# Patient Record
Sex: MALE | Race: WHITE | NOT HISPANIC OR LATINO | Employment: FULL TIME | URBAN - METROPOLITAN AREA
[De-identification: names, ages, dates, MRNs, and addresses within clinical notes are randomized per-mention and may not be internally consistent; named-entity substitution may affect disease eponyms.]

---

## 2018-11-23 ENCOUNTER — HOSPITAL ENCOUNTER (EMERGENCY)
Facility: CLINIC | Age: 48
Discharge: HOME | End: 2018-11-23
Attending: NURSE PRACTITIONER | Admitting: NURSE PRACTITIONER
Payer: COMMERCIAL

## 2018-11-23 VITALS
OXYGEN SATURATION: 98 % | BODY MASS INDEX: 30.46 KG/M2 | WEIGHT: 245 LBS | SYSTOLIC BLOOD PRESSURE: 135 MMHG | HEIGHT: 75 IN | RESPIRATION RATE: 18 BRPM | TEMPERATURE: 97.9 F | HEART RATE: 62 BPM | DIASTOLIC BLOOD PRESSURE: 82 MMHG

## 2018-11-23 DIAGNOSIS — G89.29 CHRONIC RIGHT-SIDED LOW BACK PAIN WITH RIGHT-SIDED SCIATICA: ICD-10-CM

## 2018-11-23 DIAGNOSIS — M54.41 CHRONIC RIGHT-SIDED LOW BACK PAIN WITH RIGHT-SIDED SCIATICA: ICD-10-CM

## 2018-11-23 PROCEDURE — 25000128 H RX IP 250 OP 636: Performed by: NURSE PRACTITIONER

## 2018-11-23 PROCEDURE — 25000132 ZZH RX MED GY IP 250 OP 250 PS 637: Performed by: NURSE PRACTITIONER

## 2018-11-23 PROCEDURE — 99285 EMERGENCY DEPT VISIT HI MDM: CPT

## 2018-11-23 PROCEDURE — 96372 THER/PROPH/DIAG INJ SC/IM: CPT

## 2018-11-23 RX ORDER — PREDNISONE 20 MG/1
TABLET ORAL
Qty: 10 TABLET | Refills: 0 | Status: SHIPPED | OUTPATIENT
Start: 2018-11-23 | End: 2024-02-13

## 2018-11-23 RX ORDER — CYCLOBENZAPRINE HCL 10 MG
10 TABLET ORAL ONCE
Status: COMPLETED | OUTPATIENT
Start: 2018-11-23 | End: 2018-11-23

## 2018-11-23 RX ORDER — ACETAMINOPHEN 500 MG
1000 TABLET ORAL ONCE
Status: COMPLETED | OUTPATIENT
Start: 2018-11-23 | End: 2018-11-23

## 2018-11-23 RX ORDER — OXYCODONE HYDROCHLORIDE 5 MG/1
5 TABLET ORAL ONCE
Status: COMPLETED | OUTPATIENT
Start: 2018-11-23 | End: 2018-11-23

## 2018-11-23 RX ORDER — CYCLOBENZAPRINE HCL 10 MG
10 TABLET ORAL 3 TIMES DAILY PRN
Qty: 20 TABLET | Refills: 0 | Status: SHIPPED | OUTPATIENT
Start: 2018-11-23 | End: 2018-11-29

## 2018-11-23 RX ORDER — LIDOCAINE 4 G/G
1 PATCH TOPICAL ONCE
Status: DISCONTINUED | OUTPATIENT
Start: 2018-11-23 | End: 2018-11-23 | Stop reason: HOSPADM

## 2018-11-23 RX ORDER — LIDOCAINE 50 MG/G
PATCH TOPICAL
Qty: 30 PATCH | Refills: 0 | Status: SHIPPED | OUTPATIENT
Start: 2018-11-23 | End: 2024-02-13

## 2018-11-23 RX ADMIN — OXYCODONE HYDROCHLORIDE 5 MG: 5 TABLET ORAL at 20:17

## 2018-11-23 RX ADMIN — HYDROMORPHONE HYDROCHLORIDE 1 MG: 1 INJECTION, SOLUTION INTRAMUSCULAR; INTRAVENOUS; SUBCUTANEOUS at 21:10

## 2018-11-23 RX ADMIN — CYCLOBENZAPRINE HYDROCHLORIDE 10 MG: 10 TABLET, FILM COATED ORAL at 20:17

## 2018-11-23 RX ADMIN — ACETAMINOPHEN 1000 MG: 500 TABLET, FILM COATED ORAL at 20:16

## 2018-11-23 RX ADMIN — LIDOCAINE 1 PATCH: 560 PATCH PERCUTANEOUS; TOPICAL; TRANSDERMAL at 20:19

## 2018-11-23 ASSESSMENT — ENCOUNTER SYMPTOMS
COLOR CHANGE: 0
BACK PAIN: 1
NUMBNESS: 1
FEVER: 0
DYSURIA: 0
FREQUENCY: 0
ABDOMINAL PAIN: 0
MYALGIAS: 1
WEAKNESS: 0

## 2018-11-23 NOTE — ED AVS SNAPSHOT
Emergency Department    [address] [facility] [address]    Phone:  [phone]    Fax:  [phone]                                       Zach Cowan   MRN: [id number]    Department:   Emergency Department   Date of Visit:  11/23/2018           Patient Information     Date Of Birth          1970        Your diagnoses for this visit were:     Chronic right-sided low back pain with right-sided sciatica acute on chronic       You were seen by Irma Matute, CNP.      Follow-up Information     Schedule an appointment as soon as possible for a visit with Rm Quinteros MD.    Specialty:  Physical Medicine and Rehab    Contact information:    RM QUINTEROS  [address]  [facility] [address] [phone]          Follow up with  Emergency Department.    Specialty:  EMERGENCY MEDICINE    Why:  As needed, If symptoms worsen    Contact information:    [address] [facility] [address] [phone]        Discharge Instructions       Discharge Instructions  Back Pain  You were seen today for back pain. Back pain can have many causes, but most will get better without surgery or other specific treatment. Sometimes there is a herniated ( slipped ) disc. We do not usually do MRI scans to look for these right away, since most herniated discs will get better on their own with time.  Today, we did not find any evidence that your back pain was caused by a serious condition. However, sometimes symptoms develop over time and cannot be found during an emergency visit, so it is very important that you follow up with your primary provider.  Generally, every Emergency Department visit should have a follow-up clinic visit with either a primary or a specialty clinic/provider. Please follow-up as instructed by your emergency provider today.    Return to the Emergency Department if:    You develop a fever with your back pain.     You have weakness or change in sensation in one  or both legs.    You lose control of your bowels or bladder, or cannot empty your bladder (cannot pee).    Your pain gets much worse.     Follow-up with your provider:    Unless your pain has completely gone away, please make an appointment with your provider within one week. Most of the routine care for back pain is available in a clinic and not the Emergency Department. You may need further management of your back pain, such as more pain medication, imaging such as an X-ray or MRI, or physical therapy.    What can I do to help myself?    Remain Active -- People are often afraid that they will hurt their back further or delay recovery by remaining active, but this is one of the best things you can do for your back. In fact, staying in bed for a long time to rest is not recommended. Studies have shown that people with low back pain recover faster when they remain active. Movement helps to bring blood flow to the muscles and relieve muscle spasms as well as preventing loss of muscle strength.    Heat -- Using a heating pad can help with low back pain during the first few weeks. Do not sleep with a heating pad, as you can be burned.     Pain medications - You may take a pain medication such as Tylenol  (acetaminophen), Advil , Motrin  (ibuprofen) or Aleve  (naproxen).  If you were given a prescription for medicine here today, be sure to read all of the information (including the package insert) that comes with your prescription.  This will include important information about the medicine, its side effects, and any warnings that you need to know about.  The pharmacist who fills the prescription can provide more information and answer questions you may have about the medicine.  If you have questions or concerns that the pharmacist cannot address, please call or return to the Emergency Department.   Remember that you can always come back to the Emergency Department if you are not able to see your regular provider in the  amount of time listed above, if you get any new symptoms, or if there is anything that worries you.      24 Hour Appointment Hotline       To make an appointment at any [facility], call [phone] ([phone]). If you don't have a family doctor or clinic, we will help you find one. [facility] clinics are conveniently located to serve the needs of you and your family.             Review of your medicines      START taking        Dose / Directions Last dose taken    cyclobenzaprine 10 MG tablet   Commonly known as:  FLEXERIL   Dose:  10 mg   Quantity:  20 tablet        Take 1 tablet (10 mg) by mouth 3 times daily as needed for muscle spasms   Refills:  0        lidocaine 5 % Patch   Commonly known as:  LIDODERM   Quantity:  30 patch        Apply up to 3 patches to painful area at once for up to 12 h within a 24 h period.  Remove after 12 hours.   Refills:  0        predniSONE 20 MG tablet   Commonly known as:  DELTASONE   Quantity:  10 tablet        Take two tablets (= 40mg) each day for 5 (five) days   Refills:  0                Prescriptions were sent or printed at these locations (3 Prescriptions)                   Other Prescriptions                Printed at Department/Unit printer (3 of 3)         cyclobenzaprine (FLEXERIL) 10 MG tablet               lidocaine (LIDODERM) 5 % Patch               predniSONE (DELTASONE) 20 MG tablet                Orders Needing Specimen Collection     None      Pending Results     No orders found from 11/21/2018 to 11/24/2018.            Pending Culture Results     No orders found from 11/21/2018 to 11/24/2018.            Pending Results Instructions     If you had any lab results that were not finalized at the time of your Discharge, you can call the ED Lab Result RN at [phone]. You will be contacted by this team for any positive Lab results or changes in treatment. The nurses are available 7 days a week from 10A to 6:30P.  You can leave a message 24 hours per day  and they will return your call.        Test Results From Your Hospital Stay               Clinical Quality Measure: Blood Pressure Screening     Your blood pressure was checked while you were in the emergency department today. The last reading we obtained was  BP: 157/89 . Please read the guidelines below about what these numbers mean and what you should do about them.  If your systolic blood pressure (the top number) is less than 120 and your diastolic blood pressure (the bottom number) is less than 80, then your blood pressure is normal. There is nothing more that you need to do about it.  If your systolic blood pressure (the top number) is 120-139 or your diastolic blood pressure (the bottom number) is 80-89, your blood pressure may be higher than it should be. You should have your blood pressure rechecked within a year by a primary care provider.  If your systolic blood pressure (the top number) is 140 or greater or your diastolic blood pressure (the bottom number) is 90 or greater, you may have high blood pressure. High blood pressure is treatable, but if left untreated over time it can put you at risk for heart attack, stroke, or kidney failure. You should have your blood pressure rechecked by a primary care provider within the next 4 weeks.  If your provider in the emergency department today gave you specific instructions to follow-up with your doctor or provider even sooner than that, you should follow that instruction and not wait for up to 4 weeks for your follow-up visit.        Thank you for choosing [facility]       Thank you for choosing [facility] for your care. Our goal is always to provide you with excellent care. Hearing back from our patients is one way we can continue to improve our services. Please take a few minutes to complete the written survey that you may receive in the mail after you visit with us. Thank you!        [employer] Information     [employer] lets you send messages to your doctor, view  "your test results, renew your prescriptions, schedule appointments and more. To sign up, go to [email or web address] . Click on \"Log in\" on the left side of the screen, which will take you to the Welcome page. Then click on \"Sign up Now\" on the right side of the page.     You will be asked to enter the access code listed below, as well as some personal information. Please follow the directions to create your username and password.     Your access code is: [email or web address]  Expires: 2019  9:08 PM     Your access code will  in 90 days. If you need help or a new code, please call your [place] clinic or [phone].        Care EveryWhere ID     This is your Care EveryWhere ID. This could be used by other organizations to access your [place] medical records  [id number]        Equal Access to Services     [facility] Center[facility]AR[facility] : Hadbriana Casas, la nena diaz, sloan robertalkasandra lozano, vanda chavze . So [facility] [phone].    ATENCIÓN: Si habla español, tiene a javier disposición servicios gratuitos de asistencia lingüística. Llame al [phone].    We comply with applicable federal civil rights laws and [place] laws. We do not discriminate on the basis of race, color, national origin, age, disability, sex, sexual orientation, or gender identity.            After Visit Summary       This is your record. Keep this with you and show to your community pharmacist(s) and doctor(s) at your next visit.                  "

## 2018-11-23 NOTE — ED AVS SNAPSHOT
Emergency Department    [address] [place] [address]    Phone:  [phone]    Fax:  [phone]                                       Zach Cowan   MRN: [id number]    Department:   Emergency Department   Date of Visit:  11/23/2018           After Visit Summary Signature Page     I have received my discharge instructions, and my questions have been answered. I have discussed any challenges I see with this plan with the nurse or doctor.    ..........................................................................................................................................  Patient/Patient Representative Signature      ..........................................................................................................................................  Patient Representative Print Name and Relationship to Patient    ..................................................               ................................................  Date                                   Time    ..........................................................................................................................................  Reviewed by Signature/Title    ...................................................              ..............................................  Date                                               Time          22EPIC Rev 08/18

## 2018-11-24 NOTE — ED PROVIDER NOTES
History     Chief Complaint:  Back pain    HPI   Zach Cowan is a 48 year old male who presents to the ED for evaluation of back pain. The patient reports having chronic low back pain over the past 1-2 years that has been managed with OTC pain medication and stretching. However, 2 days ago he developed worsening and more severe back pain radiating down his anterior right leg into the foot, noting it has never radiating this far down his leg in the past. The patient does report intermittent numbness to the anterior right thigh but denies any numbness in his groin area as well as no bowel or bladder incontinence. He denies any recent falls or trauma, IV drug use, fever, rash, abdominal pain, dysuria, urinary frequency, or any other symptoms. He does report working out 3 days ago, just prior to onset of his worsening symptoms, but wasn't having any back or leg pain until the day after. Yesterday, he took flexeril, which helped temporarily, as well as a heating pad and ibuprofen. The patient last took 4 ibuprofen about 1.5 hours prior to arrival. He also has a massage scheduled for tomorrow. Patient has not seen neurology or primary care in the past for his back pain and has not had any imaging.    Allergies:  No known drug allergies    Medications:    The patient is not currently taking any prescribed medications.    Past Medical History:    Back pain    Past Surgical History:    History reviewed. No pertinent surgical history.    Family History:    History reviewed. No pertinent family history.     Social History:  Smoking status: Never  Alcohol use: Yes  Marital Status: Single      Review of Systems   Constitutional: Negative for fever.   Gastrointestinal: Negative for abdominal pain.   Genitourinary: Positive for testicular pain (groin). Negative for decreased urine volume, dysuria, frequency and urgency.   Musculoskeletal: Positive for back pain and myalgias.   Skin: Negative for color change and rash.  "  Neurological: Positive for numbness. Negative for weakness.   All other systems reviewed and are negative.    Physical Exam   Patient Vitals for the past 24 hrs:   BP Temp Temp src Pulse Heart Rate Resp SpO2 Height Weight   11/23/18 2003 - 97.9  F (36.6  C) Oral - - - - - -   11/23/18 1933 157/89 - Temporal 62 62 18 97 % 1.905 m (6' 3\") 111.1 kg (245 lb)     Physical Exam  Nursing notes reviewed. Vitals reviewed.  General: Alert. Well kept.  Eyes:  Conjunctiva non-injected, non-icteric.  Neck/Throat: Moist mucous membranes. Normal voice.  Cardiac: Regular rhythm. Normal heart sounds.  Pulmonary: Clear and equal breath sounds bilaterally.   Musculoskeletal:  No midline tenderness to the spine.  Pain over the right SI joint without rash.  Normal movement at the hips/knee/ankles.   Skin:  Warm and dry without rashes.  Neuro:  Normal sensation throughout the legs.  5/5 strength at the hips/knees/ankles.  2+ patellar reflexes.  Normal gait.  Psych:  Normal affect.    Emergency Department Course     Interventions:  2016: Tylenol 1,000mg tablet PO  2017: Roxicodone 5 mg tablet PO  2017: Flexeril 10 mg tablet PO  2019: Lidocaine 4% patch Transdermal  2059: Dilaudid 1mg intramuscular    Emergency Department Course:  Past medical records, nursing notes, and vitals reviewed.  1948: I performed an exam of the patient and obtained history, as documented above. GCS 15.    2021: I rechecked the patient. Explained findings to patient.    2111: I rechecked the patient. Pain is improved and his able to transfer and ambulate without difficulty.  Findings and plan explained to the Patient. Patient discharged home with instructions regarding supportive care, medications, and reasons to return. The importance of close follow-up was reviewed.     Impression & Plan      Medical Decision Making:  Zach Cowan is a 48 year old male who presents for evaluation of back pain and radicular symptoms. They have history of back pain in the " past. The pain has improved with interventions in the Emergency Room. The patient did not sustain any trauma, therefore x-rays are not necessary due to the low likelihood of fracture or subluxation. Advanced imaging with CT/MRI is not indicated at this time, but may be indicated in the future if symptoms fail to resolve. Nor is there any indication for consultation with neurosurgery or orthopedic spinal surgeon. The patient has not had a fever, saddle/perineal anesthesia, bilateral foot numbness, or bowel or bladder dysfunction. There is no clinical evidence of cauda equina syndrome, discitis, spinal/epidural space hematoma or epidural abscess. He has had no fevers. The neurological exam is normal, with the exception of the dermatomal symptoms noted herein. The patient was advised that radiculopathy often takes significant time to resolve, and that follow up with primary care, neurology and/or neurosurgery will be indicated if symptoms do not improve. The patient will be discharged with pain medications to use as directed. No heavy lifting, bending or twisting. Return if increasing pain, muscular weakness, or bowel or bladder dysfunction.     Diagnosis:    ICD-10-CM    1. Chronic right-sided low back pain with right-sided sciatica M54.41     G89.29     acute on chronic     Disposition:  discharged to home  Discharge Medications:  New Prescriptions    CYCLOBENZAPRINE (FLEXERIL) 10 MG TABLET    Take 1 tablet (10 mg) by mouth 3 times daily as needed for muscle spasms    LIDOCAINE (LIDODERM) 5 % PATCH    Apply up to 3 patches to painful area at once for up to 12 h within a 24 h period.  Remove after 12 hours.    PREDNISONE (DELTASONE) 20 MG TABLET    Take two tablets (= 40mg) each day for 5 (five) days     Kaela Lujan  11/23/2018    EMERGENCY DEPARTMENT  RICH, Kaela Lujan, am serving as a scribe at 7:48 PM on 11/23/2018 to document services personally performed by Irma Matute CNP based on my observations and the  provider's statements to me.        Irma Matute, CNP  11/23/18 0307

## 2023-06-17 ENCOUNTER — HOSPITAL ENCOUNTER (EMERGENCY)
Facility: CLINIC | Age: 53
Discharge: HOME | End: 2023-06-17
Attending: EMERGENCY MEDICINE | Admitting: EMERGENCY MEDICINE
Payer: COMMERCIAL

## 2023-06-17 ENCOUNTER — APPOINTMENT (OUTPATIENT)
Dept: CT IMAGING | Facility: CLINIC | Age: 53
End: 2023-06-17
Attending: EMERGENCY MEDICINE
Payer: COMMERCIAL

## 2023-06-17 VITALS
TEMPERATURE: 96.8 F | WEIGHT: 220 LBS | BODY MASS INDEX: 28.23 KG/M2 | OXYGEN SATURATION: 100 % | HEIGHT: 74 IN | DIASTOLIC BLOOD PRESSURE: 82 MMHG | RESPIRATION RATE: 18 BRPM | HEART RATE: 78 BPM | SYSTOLIC BLOOD PRESSURE: 138 MMHG

## 2023-06-17 DIAGNOSIS — E11.9 TYPE 2 DIABETES MELLITUS WITHOUT COMPLICATION, WITHOUT LONG-TERM CURRENT USE OF INSULIN (H): ICD-10-CM

## 2023-06-17 DIAGNOSIS — R42 VERTIGO: ICD-10-CM

## 2023-06-17 DIAGNOSIS — R91.1 PULMONARY NODULE: ICD-10-CM

## 2023-06-17 DIAGNOSIS — R73.9 HYPERGLYCEMIA: ICD-10-CM

## 2023-06-17 LAB
ALBUMIN SERPL BCG-MCNC: 4.9 G/DL (ref 3.5–5.2)
ALP SERPL-CCNC: 66 U/L (ref 40–129)
ALT SERPL W P-5'-P-CCNC: 37 U/L (ref 0–70)
ANION GAP SERPL CALCULATED.3IONS-SCNC: 13 MMOL/L (ref 7–15)
ANION GAP SERPL CALCULATED.3IONS-SCNC: 23 MMOL/L (ref 7–15)
AST SERPL W P-5'-P-CCNC: 22 U/L (ref 0–45)
ATRIAL RATE - MUSE: 59 BPM
B-OH-BUTYR SERPL-SCNC: 0.9 MMOL/L
BASE EXCESS BLDV CALC-SCNC: -3.1 MMOL/L (ref -7.7–1.9)
BASOPHILS # BLD AUTO: 0.1 10E3/UL (ref 0–0.2)
BASOPHILS NFR BLD AUTO: 1 %
BILIRUB SERPL-MCNC: 1.3 MG/DL
BUN SERPL-MCNC: 9.4 MG/DL (ref 6–20)
BUN SERPL-MCNC: 9.7 MG/DL (ref 6–20)
CALCIUM SERPL-MCNC: 8.8 MG/DL (ref 8.6–10)
CALCIUM SERPL-MCNC: 9.8 MG/DL (ref 8.6–10)
CHLORIDE SERPL-SCNC: 102 MMOL/L (ref 98–107)
CHLORIDE SERPL-SCNC: 98 MMOL/L (ref 98–107)
CREAT SERPL-MCNC: 0.85 MG/DL (ref 0.67–1.17)
CREAT SERPL-MCNC: 0.87 MG/DL (ref 0.67–1.17)
DEPRECATED HCO3 PLAS-SCNC: 15 MMOL/L (ref 22–29)
DEPRECATED HCO3 PLAS-SCNC: 23 MMOL/L (ref 22–29)
DIASTOLIC BLOOD PRESSURE - MUSE: NORMAL MMHG
EOSINOPHIL # BLD AUTO: 0.1 10E3/UL (ref 0–0.7)
EOSINOPHIL NFR BLD AUTO: 0 %
ERYTHROCYTE [DISTWIDTH] IN BLOOD BY AUTOMATED COUNT: 11.9 % (ref 10–15)
ETHANOL SERPL-MCNC: <0.01 G/DL
GFR SERPL CREATININE-BSD FRML MDRD: >90 ML/MIN/1.73M2
GFR SERPL CREATININE-BSD FRML MDRD: >90 ML/MIN/1.73M2
GLUCOSE BLDC GLUCOMTR-MCNC: 405 MG/DL (ref 70–99)
GLUCOSE SERPL-MCNC: 316 MG/DL (ref 70–99)
GLUCOSE SERPL-MCNC: 390 MG/DL (ref 70–99)
HBA1C MFR BLD: 10.8 %
HCO3 BLDV-SCNC: 22 MMOL/L (ref 21–28)
HCT VFR BLD AUTO: 42.2 % (ref 40–53)
HGB BLD-MCNC: 15.3 G/DL (ref 13.3–17.7)
HOLD SPECIMEN: NORMAL
HOLD SPECIMEN: NORMAL
IMM GRANULOCYTES # BLD: 0.1 10E3/UL
IMM GRANULOCYTES NFR BLD: 1 %
INTERPRETATION ECG - MUSE: NORMAL
LIPASE SERPL-CCNC: 23 U/L (ref 13–60)
LYMPHOCYTES # BLD AUTO: 1 10E3/UL (ref 0.8–5.3)
LYMPHOCYTES NFR BLD AUTO: 8 %
MCH RBC QN AUTO: 30.4 PG (ref 26.5–33)
MCHC RBC AUTO-ENTMCNC: 36.3 G/DL (ref 31.5–36.5)
MCV RBC AUTO: 84 FL (ref 78–100)
MONOCYTES # BLD AUTO: 0.7 10E3/UL (ref 0–1.3)
MONOCYTES NFR BLD AUTO: 6 %
NEUTROPHILS # BLD AUTO: 10.4 10E3/UL (ref 1.6–8.3)
NEUTROPHILS NFR BLD AUTO: 84 %
NRBC # BLD AUTO: 0 10E3/UL
NRBC BLD AUTO-RTO: 0 /100
O2/TOTAL GAS SETTING VFR VENT: 0 %
P AXIS - MUSE: 63 DEGREES
PCO2 BLDV: 38 MM HG (ref 40–50)
PH BLDV: 7.37 [PH] (ref 7.32–7.43)
PLATELET # BLD AUTO: 215 10E3/UL (ref 150–450)
PO2 BLDV: 29 MM HG (ref 25–47)
POTASSIUM SERPL-SCNC: 3.6 MMOL/L (ref 3.4–5.3)
POTASSIUM SERPL-SCNC: 4.3 MMOL/L (ref 3.4–5.3)
PR INTERVAL - MUSE: 206 MS
PROT SERPL-MCNC: 7.6 G/DL (ref 6.4–8.3)
QRS DURATION - MUSE: 108 MS
QT - MUSE: 480 MS
QTC - MUSE: 475 MS
R AXIS - MUSE: 7 DEGREES
RBC # BLD AUTO: 5.03 10E6/UL (ref 4.4–5.9)
SODIUM SERPL-SCNC: 136 MMOL/L (ref 136–145)
SODIUM SERPL-SCNC: 138 MMOL/L (ref 136–145)
SYSTOLIC BLOOD PRESSURE - MUSE: NORMAL MMHG
T AXIS - MUSE: 4 DEGREES
VENTRICULAR RATE- MUSE: 59 BPM
WBC # BLD AUTO: 12.2 10E3/UL (ref 4–11)

## 2023-06-17 PROCEDURE — 82077 ASSAY SPEC XCP UR&BREATH IA: CPT | Performed by: EMERGENCY MEDICINE

## 2023-06-17 PROCEDURE — 250N000011 HC RX IP 250 OP 636: Performed by: EMERGENCY MEDICINE

## 2023-06-17 PROCEDURE — 82962 GLUCOSE BLOOD TEST: CPT

## 2023-06-17 PROCEDURE — 258N000003 HC RX IP 258 OP 636: Performed by: EMERGENCY MEDICINE

## 2023-06-17 PROCEDURE — 70498 CT ANGIOGRAPHY NECK: CPT

## 2023-06-17 PROCEDURE — 82803 BLOOD GASES ANY COMBINATION: CPT | Performed by: EMERGENCY MEDICINE

## 2023-06-17 PROCEDURE — 85025 COMPLETE CBC W/AUTO DIFF WBC: CPT | Performed by: EMERGENCY MEDICINE

## 2023-06-17 PROCEDURE — 83036 HEMOGLOBIN GLYCOSYLATED A1C: CPT | Performed by: EMERGENCY MEDICINE

## 2023-06-17 PROCEDURE — 96360 HYDRATION IV INFUSION INIT: CPT | Mod: 59

## 2023-06-17 PROCEDURE — 93005 ELECTROCARDIOGRAM TRACING: CPT

## 2023-06-17 PROCEDURE — 83690 ASSAY OF LIPASE: CPT | Performed by: EMERGENCY MEDICINE

## 2023-06-17 PROCEDURE — 70450 CT HEAD/BRAIN W/O DYE: CPT

## 2023-06-17 PROCEDURE — 82010 KETONE BODYS QUAN: CPT | Performed by: EMERGENCY MEDICINE

## 2023-06-17 PROCEDURE — 70496 CT ANGIOGRAPHY HEAD: CPT

## 2023-06-17 PROCEDURE — 99285 EMERGENCY DEPT VISIT HI MDM: CPT | Mod: 25

## 2023-06-17 PROCEDURE — 80048 BASIC METABOLIC PNL TOTAL CA: CPT | Performed by: EMERGENCY MEDICINE

## 2023-06-17 PROCEDURE — 36415 COLL VENOUS BLD VENIPUNCTURE: CPT | Performed by: EMERGENCY MEDICINE

## 2023-06-17 PROCEDURE — 250N000013 HC RX MED GY IP 250 OP 250 PS 637: Performed by: EMERGENCY MEDICINE

## 2023-06-17 PROCEDURE — 96361 HYDRATE IV INFUSION ADD-ON: CPT

## 2023-06-17 PROCEDURE — 250N000009 HC RX 250: Performed by: EMERGENCY MEDICINE

## 2023-06-17 PROCEDURE — 80053 COMPREHEN METABOLIC PANEL: CPT | Performed by: EMERGENCY MEDICINE

## 2023-06-17 RX ORDER — MECLIZINE HYDROCHLORIDE 25 MG/1
25 TABLET ORAL 3 TIMES DAILY PRN
Qty: 20 TABLET | Refills: 0 | Status: SHIPPED | OUTPATIENT
Start: 2023-06-17 | End: 2024-02-13

## 2023-06-17 RX ORDER — ONDANSETRON 2 MG/ML
4 INJECTION INTRAMUSCULAR; INTRAVENOUS
Status: DISCONTINUED | OUTPATIENT
Start: 2023-06-17 | End: 2023-06-17 | Stop reason: HOSPADM

## 2023-06-17 RX ORDER — MECLIZINE HYDROCHLORIDE 25 MG/1
25 TABLET ORAL ONCE
Status: COMPLETED | OUTPATIENT
Start: 2023-06-17 | End: 2023-06-17

## 2023-06-17 RX ORDER — SERTRALINE HYDROCHLORIDE 100 MG/1
1 TABLET, FILM COATED ORAL
COMMUNITY
Start: 2023-03-28

## 2023-06-17 RX ORDER — ONDANSETRON 4 MG/1
4 TABLET, ORALLY DISINTEGRATING ORAL EVERY 6 HOURS PRN
Qty: 15 TABLET | Refills: 0 | Status: SHIPPED | OUTPATIENT
Start: 2023-06-17 | End: 2024-02-13

## 2023-06-17 RX ORDER — IOPAMIDOL 755 MG/ML
75 INJECTION, SOLUTION INTRAVASCULAR ONCE
Status: COMPLETED | OUTPATIENT
Start: 2023-06-17 | End: 2023-06-17

## 2023-06-17 RX ADMIN — IOPAMIDOL 75 ML: 755 INJECTION, SOLUTION INTRAVENOUS at 15:15

## 2023-06-17 RX ADMIN — SODIUM CHLORIDE 90 ML: 9 INJECTION, SOLUTION INTRAVENOUS at 15:15

## 2023-06-17 RX ADMIN — SODIUM CHLORIDE 1000 ML: 9 INJECTION, SOLUTION INTRAVENOUS at 14:31

## 2023-06-17 RX ADMIN — MECLIZINE HYDROCHLORIDE 25 MG: 25 TABLET ORAL at 16:10

## 2023-06-17 RX ADMIN — SODIUM CHLORIDE 1000 ML: 9 INJECTION, SOLUTION INTRAVENOUS at 16:10

## 2023-06-17 ASSESSMENT — ACTIVITIES OF DAILY LIVING (ADL)
ADLS_ACUITY_SCORE: 35
ADLS_ACUITY_SCORE: 35

## 2023-06-17 NOTE — ED TRIAGE NOTES
Told a few years ago that he was a prediabetic and has not been back for follow up.  Feeling ill this morning, starting vomiting at noon, reports dizziness.  Patient awake, eyes closed but alert, appropriate, speech clear

## 2023-06-17 NOTE — ED PROVIDER NOTES
History   Chief Complaint:  Vomiting    HPI   History supplemented by electronic chart review    Zach Cowan is a 52 year old male who presents by EMS for evaluation of vomiting and lightheadedness that he describes as dizziness, perhaps slight spinning but not associated with any speech changes, confusion, or arm or leg symptoms.  No history of stroke or vertigo.  No palpitations.  He states that several years ago he was told he was prediabetic but did not follow-up, does not regularly check his blood sugars, has never been on medication for diabetes.  He states that he woke up early this morning, let his dogs out, went back to bed for a while and late this morning developed some lightheadedness along with vomiting, no abdominal pain, he had a normal formed bowel movement today.  Dizziness is somewhat worse with moving his head.  No ear pain.  No recent head trauma.  He drinks 5-6 alcoholic drinks about 3 nights a week, he drank last night but not more than his usual, he does not feel hung over.    Independent Historian:   EMS, who states that his blood sugar was 378, he was given Zofran ODT but then vomited, then was given Zofran IV.    Review of External Notes: I personally performed electronic chart review, I see that he was seen for orthopedic issues in 2018.  Reviewed the prescription monitoring program shows no prior opioids.    Medications:    sertraline (ZOLOFT) 100 MG tablet    Past Medical History:    Pre-diabetes    Physical Exam     Patient Vitals for the past 24 hrs:   BP Pulse SpO2   06/17/23 1700 138/82 78 100 %   06/17/23 1606 (!) 138/98 -- 99 %   06/17/23 1530 (!) 154/82 64 100 %   RR 18  96.8       Physical Exam  General: Somewhat uncomfortable appearing male sitting upright in room 6  HENT: mucous membranes moist, TMs clear, face nontender, oropharynx clear  Eyes: Resting with eyes closed but when opens his eyes, pupils are normal, no nystagmus  CV: rate as above, regular rhythm, no  murmur audible, normal radial pulses, no lower extremity edema  Resp: normal effort, speaks in full phrases, no cough observed  GI: abdomen soft and nontender, no guarding  MSK: no bony tenderness, mastoids nontender  Skin: appropriately warm and dry, no facial rash  Neuro: awake, alert, clear speech, fully oriented, face symmetric,  normal, strength and sensation intact in all extr, no nuchal rigidity, ambulation not initially tested   Psych: Slightly anxious, cooperative, no apparent hallucinations    Emergency Department Course   Electrocardiogram  ECG taken at 1450, ECG interpreted at 1451 by RIGO Velasco MD  Sinus rhythm, nonspecific ST and T waves, no ST elevation or depression  Rate 59 bpm. ME interval 206. QRS duration 108. QTc 475    Imaging:    CTA Head Neck w Contrast   Final Result   IMPRESSION:    HEAD CT:   1.  No acute intracranial process.      HEAD CTA:    1.  Normal CTA [place].      NECK CTA:   1.  Normal neck CTA.   2.  4 mm noncalcified right upper lobe pulmonary nodule. Recommend follow-up chest CT in 12 months to document stability.      CT Head w/o Contrast   Final Result   IMPRESSION:    HEAD CT:   1.  No acute intracranial process.      HEAD CTA:    1.  Normal CTA [place].      NECK CTA:   1.  Normal neck CTA.   2.  4 mm noncalcified right upper lobe pulmonary nodule. Recommend follow-up chest CT in 12 months to document stability.           Laboratory:  Labs Ordered and Resulted from Time of ED Arrival to Time of ED Departure   COMPREHENSIVE METABOLIC PANEL - Abnormal       Result Value    Sodium 136      Potassium 3.6      Chloride 98      Carbon Dioxide (CO2) 15 (*)     Anion Gap 23 (*)     Urea Nitrogen 9.7      Creatinine 0.87      Calcium 9.8      Glucose 390 (*)     Alkaline Phosphatase 66      AST 22      ALT 37      Protein Total 7.6      Albumin 4.9      Bilirubin Total 1.3 (*)     GFR Estimate >90     CBC WITH PLATELETS AND DIFFERENTIAL - Abnormal    WBC  Count 12.2 (*)     RBC Count 5.03      Hemoglobin 15.3      Hematocrit 42.2      MCV 84      MCH 30.4      MCHC 36.3      RDW 11.9      Platelet Count 215      % Neutrophils 84      % Lymphocytes 8      % Monocytes 6      % Eosinophils 0      % Basophils 1      % Immature Granulocytes 1      NRBCs per 100 WBC 0      Absolute Neutrophils 10.4 (*)     Absolute Lymphocytes 1.0      Absolute Monocytes 0.7      Absolute Eosinophils 0.1      Absolute Basophils 0.1      Absolute Immature Granulocytes 0.1      Absolute NRBCs 0.0     KETONE BETA-HYDROXYBUTYRATE QUANTITATIVE, RAPID - Abnormal    Ketone (Beta-Hydroxybutyrate) Quantitative 0.90 (*)    BLOOD GAS VENOUS - Abnormal    pH Venous 7.37      pCO2 Venous 38 (*)     pO2 Venous 29      Bicarbonate Venous 22      Base Excess/Deficit (+/-) -3.1      FIO2 0     GLUCOSE BY METER - Abnormal    GLUCOSE BY METER POCT 405 (*)    BASIC METABOLIC PANEL - Abnormal    Sodium 138      Potassium 4.3      Chloride 102      Carbon Dioxide (CO2) 23      Anion Gap 13      Urea Nitrogen 9.4      Creatinine 0.85      Calcium 8.8      Glucose 316 (*)     GFR Estimate >90     HEMOGLOBIN A1C - Abnormal    Hemoglobin A1C 10.8 (*)    LIPASE - Normal    Lipase 23     ETHYL ALCOHOL LEVEL - Normal    Alcohol ethyl <0.01        Emergency Department Course:  Reviewed:  I reviewed nursing notes, vitals, and past medical history    Assessments/Consultations/Discussion of Management or Tests :  I obtained history and examined the patient as noted above.   ED Course as of 06/18/23 1504   Sat Jun 17, 2023   1601 I rechecked patient, he states he is feeling much better.  He would prefer to go home.  We agreed on a plan for further IV fluids, repeat blood counts, road test, oral challenge.  Also still awaiting CT results.   1806 I rechecked patient, discussed repeat test results.     Independent Interpretation (X-rays, CTs, rhythm strip):  I personally reviewed his head CT images, no large intracranial  hemorrhage is seen    Interventions:  Medications   0.9% sodium chloride BOLUS (0 mLs Intravenous Stopped 6/17/23 1612)   meclizine (ANTIVERT) tablet 25 mg (25 mg Oral $Given 6/17/23 1610)   Saline (90 mLs As instructed $Given 6/17/23 1515)   iopamidol (ISOVUE-370) solution 75 mL (75 mLs Intravenous $Given 6/17/23 1515)   0.9% sodium chloride BOLUS (0 mLs Intravenous Stopped 6/17/23 1825)      Social Determinants of Health affecting care:   None    Disposition:  Discharge    Impression & Plan    Medical Decision Making:  He presents with acute vertigo, I think is most likely that this is peripheral vertigo though given his age and history of prediabetes, patient agreed to pursue CT and CT angiogram to evaluate for vertebrobasilar insufficiency or other evidence of stroke or neurovascular process.  Highly doubt subarachnoid hemorrhage.  No space-occupying lesion is seen.  We did discuss the relative sensitivity of CT, and discussed the possibility of performing MRI of his brain to more definitively rule out stroke or other subtle intracranial findings.  However, in the meantime, with symptomatic treatment and time, his symptoms completely resolved such that he was able to pass a road test and oral challenge.  His brother arrived and we discussed the situation in detail.  Work-up also reveals an elevated A1c and high blood sugar, with ketonemia and an initially low bicarb though his pH is normal.  He was given IV fluids and repeat chemistry is much improved.  I do not think this represents DKA, though the rationale for initiating metformin for new diagnosis of diabetes was reviewed with him along with corresponding recommendation for close follow-up through his primary clinic.  He agrees with this.  Pulmonary nodule will require some follow-up as well, this was documented for him.  He was discharged home in improved condition.    Diagnosis:    ICD-10-CM    1. Vertigo  R42       2. Hyperglycemia  R73.9       3.  Pulmonary nodule  R91.1       4. Type 2 diabetes mellitus without complication, without long-term current use of insulin (H)  E11.9          Discharge Prescriptions:  Discharge Medication List as of 6/17/2023  6:25 PM      START taking these medications    Details   meclizine (ANTIVERT) 25 MG tablet Take 1 tablet (25 mg) by mouth 3 times daily as needed for dizziness, Disp-20 tablet, R-0, E-Prescribe      metFORMIN (GLUCOPHAGE) 500 MG tablet Take 1 tablet (500 mg) by mouth 2 times daily (with meals) for 15 days, Disp-30 tablet, R-0, E-Prescribe      ondansetron (ZOFRAN ODT) 4 MG ODT tab Take 1 tablet (4 mg) by mouth every 6 hours as needed for nausea, Disp-15 tablet, R-0, E-Prescribe           6/17/2023   MD Rod Richter, Jenaro Mejía MD  06/18/23 2509

## 2023-06-17 NOTE — ED TRIAGE NOTES
Triage Assessment     Row Name 06/17/23 1422       Triage Assessment (Adult)    Airway WDL WDL       Skin Circulation/Temperature WDL    Skin Circulation/Temperature WDL X;all  moist       Peripheral/Neurovascular WDL    Peripheral Neurovascular WDL WDL       Cognitive/Neuro/Behavioral WDL    Cognitive/Neuro/Behavioral WDL WDL       Bruce Coma Scale    Best Eye Response 4-->(E4) spontaneous    Best Motor Response 6-->(M6) obeys commands    Best Verbal Response 5-->(V5) oriented    Bruce Coma Scale Score 15

## 2023-06-17 NOTE — ED NOTES
Bed: ED06  Expected date: 6/17/23  Expected time: 2:02 PM  Means of arrival: Ambulance  Comments:  524 52m diabetic 378  eta 1402

## 2023-11-10 ENCOUNTER — HOSPITAL ENCOUNTER (EMERGENCY)
Facility: CLINIC | Age: 53
Discharge: HOME | End: 2023-11-10
Attending: EMERGENCY MEDICINE | Admitting: EMERGENCY MEDICINE
Payer: COMMERCIAL

## 2023-11-10 VITALS
TEMPERATURE: 97 F | RESPIRATION RATE: 15 BRPM | OXYGEN SATURATION: 100 % | DIASTOLIC BLOOD PRESSURE: 85 MMHG | SYSTOLIC BLOOD PRESSURE: 142 MMHG | HEART RATE: 70 BPM

## 2023-11-10 DIAGNOSIS — E11.65 HYPERGLYCEMIA DUE TO DIABETES MELLITUS (H): ICD-10-CM

## 2023-11-10 DIAGNOSIS — R42 DIZZINESS: ICD-10-CM

## 2023-11-10 DIAGNOSIS — R11.2 NAUSEA AND VOMITING, UNSPECIFIED VOMITING TYPE: ICD-10-CM

## 2023-11-10 LAB
ALBUMIN SERPL BCG-MCNC: 4.8 G/DL (ref 3.5–5.2)
ALP SERPL-CCNC: 61 U/L (ref 40–129)
ALT SERPL W P-5'-P-CCNC: 28 U/L (ref 0–70)
ANION GAP SERPL CALCULATED.3IONS-SCNC: 20 MMOL/L (ref 7–15)
AST SERPL W P-5'-P-CCNC: 15 U/L (ref 0–45)
ATRIAL RATE - MUSE: 84 BPM
B-OH-BUTYR SERPL-SCNC: <0.18 MMOL/L
BASOPHILS # BLD AUTO: 0.1 10E3/UL (ref 0–0.2)
BASOPHILS NFR BLD AUTO: 1 %
BILIRUB SERPL-MCNC: 0.7 MG/DL
BUN SERPL-MCNC: 13.3 MG/DL (ref 6–20)
CALCIUM SERPL-MCNC: 9.4 MG/DL (ref 8.6–10)
CHLORIDE SERPL-SCNC: 102 MMOL/L (ref 98–107)
CREAT SERPL-MCNC: 0.79 MG/DL (ref 0.67–1.17)
DEPRECATED HCO3 PLAS-SCNC: 15 MMOL/L (ref 22–29)
DIASTOLIC BLOOD PRESSURE - MUSE: NORMAL MMHG
EGFRCR SERPLBLD CKD-EPI 2021: >90 ML/MIN/1.73M2
EOSINOPHIL # BLD AUTO: 0.3 10E3/UL (ref 0–0.7)
EOSINOPHIL NFR BLD AUTO: 3 %
ERYTHROCYTE [DISTWIDTH] IN BLOOD BY AUTOMATED COUNT: 11.8 % (ref 10–15)
GLUCOSE BLDC GLUCOMTR-MCNC: 177 MG/DL (ref 70–99)
GLUCOSE BLDC GLUCOMTR-MCNC: 292 MG/DL (ref 70–99)
GLUCOSE SERPL-MCNC: 300 MG/DL (ref 70–99)
HBA1C MFR BLD: 10.1 %
HCO3 BLDV-SCNC: 14 MMOL/L (ref 21–28)
HCT VFR BLD AUTO: 41.6 % (ref 40–53)
HGB BLD-MCNC: 15 G/DL (ref 13.3–17.7)
IMM GRANULOCYTES # BLD: 0.1 10E3/UL
IMM GRANULOCYTES NFR BLD: 1 %
INTERPRETATION ECG - MUSE: NORMAL
LACTATE BLD-SCNC: 4.8 MMOL/L
LACTATE SERPL-SCNC: 2.5 MMOL/L (ref 0.7–2)
LIPASE SERPL-CCNC: 49 U/L (ref 13–60)
LYMPHOCYTES # BLD AUTO: 2.9 10E3/UL (ref 0.8–5.3)
LYMPHOCYTES NFR BLD AUTO: 27 %
MCH RBC QN AUTO: 30.8 PG (ref 26.5–33)
MCHC RBC AUTO-ENTMCNC: 36.1 G/DL (ref 31.5–36.5)
MCV RBC AUTO: 85 FL (ref 78–100)
MONOCYTES # BLD AUTO: 0.9 10E3/UL (ref 0–1.3)
MONOCYTES NFR BLD AUTO: 8 %
NEUTROPHILS # BLD AUTO: 6.7 10E3/UL (ref 1.6–8.3)
NEUTROPHILS NFR BLD AUTO: 60 %
NRBC # BLD AUTO: 0 10E3/UL
NRBC BLD AUTO-RTO: 0 /100
P AXIS - MUSE: 58 DEGREES
PCO2 BLDV: 20 MM HG (ref 40–50)
PH BLDV: 7.44 [PH] (ref 7.32–7.43)
PLATELET # BLD AUTO: 268 10E3/UL (ref 150–450)
PO2 BLDV: 78 MM HG (ref 25–47)
POTASSIUM SERPL-SCNC: 3.8 MMOL/L (ref 3.4–5.3)
PR INTERVAL - MUSE: 174 MS
PROT SERPL-MCNC: 7.6 G/DL (ref 6.4–8.3)
QRS DURATION - MUSE: 98 MS
QT - MUSE: 416 MS
QTC - MUSE: 491 MS
R AXIS - MUSE: 34 DEGREES
RBC # BLD AUTO: 4.87 10E6/UL (ref 4.4–5.9)
SAO2 % BLDV: 96 % (ref 94–100)
SODIUM SERPL-SCNC: 137 MMOL/L (ref 135–145)
SYSTOLIC BLOOD PRESSURE - MUSE: NORMAL MMHG
T AXIS - MUSE: 50 DEGREES
TROPONIN T SERPL HS-MCNC: <6 NG/L
VENTRICULAR RATE- MUSE: 84 BPM
WBC # BLD AUTO: 11 10E3/UL (ref 4–11)

## 2023-11-10 PROCEDURE — 36415 COLL VENOUS BLD VENIPUNCTURE: CPT | Performed by: EMERGENCY MEDICINE

## 2023-11-10 PROCEDURE — 83690 ASSAY OF LIPASE: CPT | Performed by: EMERGENCY MEDICINE

## 2023-11-10 PROCEDURE — 80053 COMPREHEN METABOLIC PANEL: CPT | Performed by: EMERGENCY MEDICINE

## 2023-11-10 PROCEDURE — 96375 TX/PRO/DX INJ NEW DRUG ADDON: CPT

## 2023-11-10 PROCEDURE — 250N000012 HC RX MED GY IP 250 OP 636 PS 637: Performed by: EMERGENCY MEDICINE

## 2023-11-10 PROCEDURE — 93005 ELECTROCARDIOGRAM TRACING: CPT

## 2023-11-10 PROCEDURE — 96366 THER/PROPH/DIAG IV INF ADDON: CPT

## 2023-11-10 PROCEDURE — 85025 COMPLETE CBC W/AUTO DIFF WBC: CPT | Performed by: EMERGENCY MEDICINE

## 2023-11-10 PROCEDURE — 82962 GLUCOSE BLOOD TEST: CPT

## 2023-11-10 PROCEDURE — 83036 HEMOGLOBIN GLYCOSYLATED A1C: CPT | Performed by: EMERGENCY MEDICINE

## 2023-11-10 PROCEDURE — 84484 ASSAY OF TROPONIN QUANT: CPT | Performed by: EMERGENCY MEDICINE

## 2023-11-10 PROCEDURE — 83605 ASSAY OF LACTIC ACID: CPT

## 2023-11-10 PROCEDURE — 36415 COLL VENOUS BLD VENIPUNCTURE: CPT

## 2023-11-10 PROCEDURE — 82803 BLOOD GASES ANY COMBINATION: CPT

## 2023-11-10 PROCEDURE — 96361 HYDRATE IV INFUSION ADD-ON: CPT

## 2023-11-10 PROCEDURE — 250N000011 HC RX IP 250 OP 636: Mod: JZ | Performed by: EMERGENCY MEDICINE

## 2023-11-10 PROCEDURE — 96365 THER/PROPH/DIAG IV INF INIT: CPT

## 2023-11-10 PROCEDURE — 96376 TX/PRO/DX INJ SAME DRUG ADON: CPT

## 2023-11-10 PROCEDURE — 258N000003 HC RX IP 258 OP 636: Performed by: EMERGENCY MEDICINE

## 2023-11-10 PROCEDURE — 82010 KETONE BODYS QUAN: CPT | Performed by: EMERGENCY MEDICINE

## 2023-11-10 PROCEDURE — 99284 EMERGENCY DEPT VISIT MOD MDM: CPT | Mod: 25

## 2023-11-10 RX ORDER — ONDANSETRON 2 MG/ML
4 INJECTION INTRAMUSCULAR; INTRAVENOUS ONCE
Status: COMPLETED | OUTPATIENT
Start: 2023-11-10 | End: 2023-11-10

## 2023-11-10 RX ORDER — DEXTROSE MONOHYDRATE 25 G/50ML
25-50 INJECTION, SOLUTION INTRAVENOUS
Status: DISCONTINUED | OUTPATIENT
Start: 2023-11-10 | End: 2023-11-10 | Stop reason: HOSPADM

## 2023-11-10 RX ORDER — POTASSIUM CHLORIDE 7.45 MG/ML
10 INJECTION INTRAVENOUS ONCE
Status: COMPLETED | OUTPATIENT
Start: 2023-11-10 | End: 2023-11-10

## 2023-11-10 RX ADMIN — SODIUM CHLORIDE 1000 ML: 9 INJECTION, SOLUTION INTRAVENOUS at 07:19

## 2023-11-10 RX ADMIN — SODIUM CHLORIDE 10 UNITS: 9 INJECTION, SOLUTION INTRAVENOUS at 09:19

## 2023-11-10 RX ADMIN — ONDANSETRON 4 MG: 2 INJECTION INTRAMUSCULAR; INTRAVENOUS at 06:12

## 2023-11-10 RX ADMIN — POTASSIUM CHLORIDE 10 MEQ: 7.46 INJECTION, SOLUTION INTRAVENOUS at 08:41

## 2023-11-10 RX ADMIN — SODIUM CHLORIDE 1000 ML: 9 INJECTION, SOLUTION INTRAVENOUS at 09:24

## 2023-11-10 RX ADMIN — ONDANSETRON 4 MG: 2 INJECTION INTRAMUSCULAR; INTRAVENOUS at 07:19

## 2023-11-10 RX ADMIN — SODIUM CHLORIDE 1000 ML: 9 INJECTION, SOLUTION INTRAVENOUS at 06:11

## 2023-11-10 ASSESSMENT — ACTIVITIES OF DAILY LIVING (ADL)
ADLS_ACUITY_SCORE: 35
ADLS_ACUITY_SCORE: 33
ADLS_ACUITY_SCORE: 35

## 2023-11-10 NOTE — ED PROVIDER NOTES
"    History     Chief Complaint:  Nausea & Vomiting       HPI   Zach Cowan is a 53 year old male presenting with acute onset nausea, vomiting, diarrhea and dizziness that started this morning.  He states he was awakened feeling well, and acutely began having his symptoms.  He describes that he presented here with the same symptoms back in June.  He states he is \"prediabetic\" and is prescribed metformin but is not taking it.  He had a few alcoholic beverages at happy hour yesterday, but did not eat much otherwise.  No fever, chest pain, shortness of breath, abdominal pain.  He has had loose stool that started this morning as well.  No hematuria, dysuria, urinary frequency.  No weakness, numbness/tingling in the extremities. He reports \" I have not been taking care of myself.\"      Independent Historian:    Patient only    Review of External Notes:  6/17/2023: Emergency department note -patient presented with similar symptoms.  Head imaging was negative.  He was found to be hyperglycemic.  Symptoms resolved and patient was discharged.    Medications:    metFORMIN (GLUCOPHAGE) 500 MG tablet  lidocaine (LIDODERM) 5 % Patch  meclizine (ANTIVERT) 25 MG tablet  ondansetron (ZOFRAN ODT) 4 MG ODT tab  predniSONE (DELTASONE) 20 MG tablet  sertraline (ZOLOFT) 100 MG tablet        Past Medical History:    No past medical history on file.    Past Surgical History:    No past surgical history on file.       Physical Exam   Patient Vitals for the past 24 hrs:   BP Temp Temp src Pulse Resp SpO2   11/10/23 1000 (!) 142/85 -- -- 70 15 100 %   11/10/23 0900 (!) 146/84 -- -- 64 (!) 8 98 %   11/10/23 0830 (!) 142/83 -- -- 56 -- --   11/10/23 0815 -- -- -- 63 15 98 %   11/10/23 0800 (!) 140/82 -- -- 63 (!) 4 98 %   11/10/23 0720 137/89 97  F (36.1  C) Temporal 65 18 99 %        Physical Exam  General: Sitting up in bed.  Appears uncomfortable  Head: No obvious trauma to head.  Ears, Nose, Throat:  External ears normal.  Nose " normal.  No pharyngeal erythema, swelling or exudate.  Midline uvula. Moist mucus membranes.  Eyes:  Conjunctivae clear.   Neck: Normal range of motion.  Neck supple.   CV: Regular rate and rhythm.  No murmurs.      Respiratory: Effort normal and breath sounds normal.  No wheezing or crackles.   Gastrointestinal: Soft.  No distension. There is no tenderness.  There is no rigidity, no rebound and no guarding.   Musculoskeletal: Normal range of motion.  Non tender extremities to palpations.    Neuro: Alert. Moving all extremities appropriately.  Normal speech. No facial droop.  Skin: Skin is warm and dry.  No rash noted.   Psych: Normal mood and affect. Behavior is normal.       Emergency Department Course   ECG  ECG results from 11/10/23   EKG 12 lead     Value    Systolic Blood Pressure     Diastolic Blood Pressure     Ventricular Rate 84    Atrial Rate 84    ND Interval 174    QRS Duration 98        QTc 491    P Axis 58    R AXIS 34    T Axis 50    Interpretation ECG      Sinus rhythm, prolonged QTc, no ischemic changes  No significant change when compared to previous EKG on 6/17/2023           Laboratory:  Labs Ordered and Resulted from Time of ED Arrival to Time of ED Departure   COMPREHENSIVE METABOLIC PANEL - Abnormal       Result Value    Sodium 137      Potassium 3.8      Carbon Dioxide (CO2) 15 (*)     Anion Gap 20 (*)     Urea Nitrogen 13.3      Creatinine 0.79      GFR Estimate >90      Calcium 9.4      Chloride 102      Glucose 300 (*)     Alkaline Phosphatase 61      AST 15      ALT 28      Protein Total 7.6      Albumin 4.8      Bilirubin Total 0.7     ISTAT GASES LACTATE VENOUS POCT - Abnormal    Lactic Acid POCT 4.8 (*)     Bicarbonate Venous POCT 14 (*)     O2 Sat, Venous POCT 96      pCO2 Venous POCT 20 (*)     pH Venous POCT 7.44 (*)     pO2 Venous POCT 78 (*)    HEMOGLOBIN A1C - Abnormal    Hemoglobin A1C 10.1 (*)    GLUCOSE BY METER - Abnormal    GLUCOSE BY METER POCT 292 (*)    LACTIC ACID  WHOLE BLOOD - Abnormal    Lactic Acid 2.5 (*)    GLUCOSE BY METER - Abnormal    GLUCOSE BY METER POCT 177 (*)    LIPASE - Normal    Lipase 49     TROPONIN T, HIGH SENSITIVITY - Normal    Troponin T, High Sensitivity <6     KETONE BETA-HYDROXYBUTYRATE QUANTITATIVE, RAPID - Normal    Ketone (Beta-Hydroxybutyrate) Quantitative <0.18     CBC WITH PLATELETS AND DIFFERENTIAL    WBC Count 11.0      RBC Count 4.87      Hemoglobin 15.0      Hematocrit 41.6      MCV 85      MCH 30.8      MCHC 36.1      RDW 11.8      Platelet Count 268      % Neutrophils 60      % Lymphocytes 27      % Monocytes 8      % Eosinophils 3      % Basophils 1      % Immature Granulocytes 1      NRBCs per 100 WBC 0      Absolute Neutrophils 6.7      Absolute Lymphocytes 2.9      Absolute Monocytes 0.9      Absolute Eosinophils 0.3      Absolute Basophils 0.1      Absolute Immature Granulocytes 0.1      Absolute NRBCs 0.0          Procedures   NA    Emergency Department Course & Assessments:             Interventions:  Medications   ondansetron (ZOFRAN) injection 4 mg (4 mg Intravenous $Given 11/10/23 0612)   sodium chloride 0.9% BOLUS 1,000 mL (0 mLs Intravenous Stopped 11/10/23 0845)   sodium chloride 0.9% BOLUS 1,000 mL (0 mLs Intravenous Stopped 11/10/23 0845)   ondansetron (ZOFRAN) injection 4 mg (4 mg Intravenous $Given 11/10/23 0719)   potassium chloride 10 mEq in 100 mL sterile water infusion (0 mEq Intravenous Stopped 11/10/23 1031)   insulin regular 1 unit/mL injection 10 Units (10 Units Intravenous $Given 11/10/23 0919)   sodium chloride 0.9% BOLUS 1,000 mL (0 mLs Intravenous Stopped 11/10/23 1031)        Assessments:  0702 -initial evaluation and assessment of patient  0921 -I reevaluated the patient  1140 -I reevaluated the patient    Independent Interpretation (X-rays, CTs, rhythm strip):  None    Consultations/Discussion of Management or Tests:  None       Social Determinants of Health affecting care:  Healthcare Access/Compliance      Disposition:  The patient was discharged to home.     Impression & Plan    CMS Diagnoses: None      Medical Decision Making:  Patient appears uncomfortable upon arrival.  He denies any abdominal pain, and I not believe that abdominal imaging is necessary at this time.  Blood glucose is 300.  She has an anion gap with an elevated lactate.  There is concern for possible DKA, however she is not acidotic and no elevation of beta hydroxybutyrate, so he does not meet criteria for DKA.  He is given 2 doses of Zofran and 3 boluses of 1 L of normal saline, as well as 10 units of insulin.  Lactate improves and glucose decreases to 177.  Upon reevaluation, he reports his symptoms have completely resolved.  He is interested in discharging home.  He is strongly encouraged to take his metformin as prescribed.  He states that he will do this.  He has half of a bottle of metformin at home. He is instructed to follow-up with his primary care provider as soon as possible.  In the meantime I am giving a one-time refill of metformin in case there is any delay with him getting into see his PCP.  He is discharged home in stable condition with his wife.        Diagnosis:    ICD-10-CM    1. Hyperglycemia due to diabetes mellitus (H)  E11.65       2. Nausea and vomiting, unspecified vomiting type  R11.2       3. Dizziness  R42            Discharge Medications:  Discharge Medication List as of 11/10/2023 11:51 AM             Aldo Hay MD  11/10/2023   Adlo Hay MD Peery, Stephen, MD  11/10/23 1079

## 2023-11-10 NOTE — ED TRIAGE NOTES
Patient here with  dizziness ,nausea and vomiting which started this morning. He also c/o dizziness. He was  very diaphoretic in triage     Triage Assessment (Adult)       Row Name 11/10/23 0559          Triage Assessment    Airway WDL WDL        Respiratory WDL    Respiratory WDL WDL        Skin Circulation/Temperature WDL    Skin Circulation/Temperature WDL WDL        Cardiac WDL    Cardiac WDL WDL        Peripheral/Neurovascular WDL    Peripheral Neurovascular WDL WDL        Cognitive/Neuro/Behavioral WDL    Cognitive/Neuro/Behavioral WDL WDL

## 2024-02-12 NOTE — PROGRESS NOTES
Medication Therapy Management (MTM) Encounter    ASSESSMENT:                            Medication Adherence/Access: No issues identified    Diabetes:   Patient is not meeting A1c goal of < 7%.  Self monitoring of blood glucose is not being done. Recommended either fasting blood sugar daily or CGM. Patient chose to start Freestyle Le.  Discussed that per ADA guidelines, metformin and insulin would be recommended. Patient requested to first try GLP-1. Reviewed the mechanism of action, risks (both common and rare but serious) and benefits of Mounjaro. Patient denied personal or family history of medullary thyroid carcinoma (MTC), personal history of multiple endocrine neoplasia syndrome type 2 and history of pancreatitis. Storage, administration and disposal instructions were reviewed using a teach back technique. Provided patient with mounjaro savings card as well.    Hypertension:   Patient is not meeting blood pressure goal of < 130/80mmHg. Due to time constraints unable to discuss in detail. Could consider starting lisinopril in the future.    Depression/Anxiety/Insomnia:   Stable. Continue following with psychiatry.      PLAN:                            Start checking blood sugars using the Freestyle El 3 monitor. Our goal is to get you to ta time in target of >70%.  Start Mounjaro 2.5 mg a week  Here is the Mounjaro savings card information if needed: https://www.[employer]/savings-resources#savings    Follow-up: Return in about 4 weeks (around 3/12/2024) for Medication Therapy Management, Phone Visit.    SUBJECTIVE/OBJECTIVE:                          Zach Cowan is a 53 year old male coming in for an initial visit. He was referred to me from Dr. Diggs.    Patient was 15 min late to visit today.    Reason for visit: diabetes.    Allergies/ADRs: Reviewed in chart  Past Medical History: Reviewed in chart  Tobacco: He reports that he has never smoked. He has never used smokeless tobacco.  Alcohol: 4-6  beverages / week    Medication Adherence/Access: no issues reported    Diabetes     Hx: metformin (reports wife is an anesthetist and she recommended he not take it as there are better newer options). Would be open to starting metformin in the future if needed, but would prefer injectables/GLP-1s first.  Blood sugar monitoring: never. Nothing against   Current diabetes symptoms: polyuria, polydipsia, fatigue, numbness/tingling, and vision changes  Diet/Exercise: Reports in the morning he will usually have eggs/sausage/protein drink, does have a lot of yogurt/trail mix/ granola, meat and potatoes for dinner. Tries avoid breads.  Reports that he used to a lot, but then he hasn't due to having it be out of his routine. Reports he does have a gym membership that isn't being used.           Lab Results   Component Value Date    A1C 10.1 (H) 11/10/2023     Hypertension   Not currently on anything for blood pressure as it has historically been good.  Patient does not self-monitor blood pressure.       BP Readings from Last 3 Encounters:   02/13/24 (!) 152/100   11/10/23 (!) 142/85   06/17/23 138/82     Pulse Readings from Last 3 Encounters:   02/13/24 75   11/10/23 70   06/17/23 78         Depression/Anxiety/Insomnia:  Sertraline 100 mg once daily  Wellbutrin 150 mg daily  Trazodone 50 mg daily  No concerns with side effects.  Reports mood is okay, but can be up and down. Reports he has 3 step kids, which can be very stressful.   No concerns with sleep at this time.  Patient has a history of substance abuse, both cocaine and alcohol. Started drinking again in Sept 2022.  Patient follows with Dr. BERRY (psychiatrist) who manages his sertraline.         Today's Vitals: BP (!) 152/100   Pulse 75   Wt 232 lb 3.2 oz (105.3 kg)   BMI 29.81 kg/m    ----------------      I spent 40 minutes with this patient today. All changes were made via collaborative practice agreement with Gautam Diggs MD. A copy of the visit note was  provided to the patient's provider(s).    A summary of these recommendations was given to the patient.    Margie Staton, PharmD  Medication Therapy Management Pharmacist  Voicemail: [phone]           Medication Therapy Recommendations  Type 2 diabetes mellitus without complication, without long-term current use of insulin (H)    Current Medication: tirzepatide (MOUNJARO) 2.5 MG/0.5ML pen   Rationale: Untreated condition - Needs additional medication therapy - Indication   Recommendation: Start Medication   Status: Accepted per CPA          Current Medication: tirzepatide (MOUNJARO) 2.5 MG/0.5ML pen   Rationale: Medication requires monitoring - Needs additional monitoring   Recommendation: Self-Monitoring   Status: Accepted per CPA

## 2024-02-13 ENCOUNTER — OFFICE VISIT (OUTPATIENT)
Dept: PHARMACY | Facility: PHYSICIAN GROUP | Age: 54
End: 2024-02-13
Payer: COMMERCIAL

## 2024-02-13 VITALS
DIASTOLIC BLOOD PRESSURE: 100 MMHG | HEART RATE: 75 BPM | WEIGHT: 232.2 LBS | BODY MASS INDEX: 29.81 KG/M2 | SYSTOLIC BLOOD PRESSURE: 152 MMHG

## 2024-02-13 DIAGNOSIS — F32.9 MAJOR DEPRESSIVE DISORDER WITH CURRENT ACTIVE EPISODE, UNSPECIFIED DEPRESSION EPISODE SEVERITY, UNSPECIFIED WHETHER RECURRENT: ICD-10-CM

## 2024-02-13 DIAGNOSIS — E11.9 TYPE 2 DIABETES MELLITUS WITHOUT COMPLICATION, WITHOUT LONG-TERM CURRENT USE OF INSULIN (H): Primary | ICD-10-CM

## 2024-02-13 DIAGNOSIS — F41.9 ANXIETY: ICD-10-CM

## 2024-02-13 DIAGNOSIS — I10 BENIGN ESSENTIAL HYPERTENSION: ICD-10-CM

## 2024-02-13 DIAGNOSIS — G47.00 INSOMNIA, UNSPECIFIED TYPE: ICD-10-CM

## 2024-02-13 PROCEDURE — 99207 PR NO CHARGE LOS: CPT | Performed by: PHARMACIST

## 2024-02-13 RX ORDER — TIRZEPATIDE 2.5 MG/.5ML
2.5 INJECTION, SOLUTION SUBCUTANEOUS
COMMUNITY

## 2024-02-13 RX ORDER — TRAZODONE HYDROCHLORIDE 50 MG/1
50 TABLET, FILM COATED ORAL AT BEDTIME
COMMUNITY

## 2024-02-13 RX ORDER — DOXYCYCLINE 100 MG/1
100 CAPSULE ORAL DAILY
COMMUNITY
Start: 2023-11-15

## 2024-02-13 RX ORDER — BUPROPION HYDROCHLORIDE 150 MG/1
1 TABLET ORAL
COMMUNITY
Start: 2023-12-05

## 2024-02-13 NOTE — PATIENT INSTRUCTIONS
"Recommendations from today's MTM visit:                                                    MTM (medication therapy management) is a service provided by a clinical pharmacist designed to help you get the most of out of your medicines.   Today we reviewed what your medicines are for, how to know if they are working, that your medicines are safe and how to make your medicine regimen as easy as possible.      Start checking blood sugars using the Freestyle El 3 monitor. Our goal is to get you to ta time in target of >70%.  Start Mounjaro 2.5 mg a week  Here is the Mounjaro savings card information if needed: https://www.[employer]/savings-resources#savings    Follow-up: Return in about 4 weeks (around 3/12/2024) for Medication Therapy Management, Phone Visit.    It was great speaking with you today.  I value your experience and would be very thankful for your time in providing feedback in our clinic survey. In the next few days, you may receive an email or text message from [employer] with a link to a survey related to your  clinical pharmacist.\"     To schedule another MTM appointment, please call the clinic directly or you may call the MTM scheduling line at [phone].    My Clinical Pharmacist's contact information:                                                      Please feel free to contact me with any questions or concerns you have.      Margie Staton, PharmD  Medication Therapy Management Pharmacist  Voicemail: [phone]     "

## 2024-08-01 ENCOUNTER — HOSPITAL ENCOUNTER (EMERGENCY)
Facility: CLINIC | Age: 54
Discharge: HOME | End: 2024-08-01
Attending: STUDENT IN AN ORGANIZED HEALTH CARE EDUCATION/TRAINING PROGRAM | Admitting: STUDENT IN AN ORGANIZED HEALTH CARE EDUCATION/TRAINING PROGRAM
Payer: COMMERCIAL

## 2024-08-01 VITALS
TEMPERATURE: 97.9 F | DIASTOLIC BLOOD PRESSURE: 88 MMHG | RESPIRATION RATE: 20 BRPM | SYSTOLIC BLOOD PRESSURE: 150 MMHG | HEART RATE: 61 BPM | OXYGEN SATURATION: 100 %

## 2024-08-01 DIAGNOSIS — R11.0 NAUSEA: ICD-10-CM

## 2024-08-01 DIAGNOSIS — R42 LIGHTHEADEDNESS: Primary | ICD-10-CM

## 2024-08-01 DIAGNOSIS — E11.65 TYPE 2 DIABETES MELLITUS WITH HYPERGLYCEMIA, WITHOUT LONG-TERM CURRENT USE OF INSULIN (H): ICD-10-CM

## 2024-08-01 DIAGNOSIS — I10 HYPERTENSION, UNSPECIFIED TYPE: ICD-10-CM

## 2024-08-01 LAB
ALBUMIN SERPL BCG-MCNC: 4.8 G/DL (ref 3.5–5.2)
ALP SERPL-CCNC: 46 U/L (ref 40–150)
ALT SERPL W P-5'-P-CCNC: 38 U/L (ref 0–70)
ANION GAP SERPL CALCULATED.3IONS-SCNC: 13 MMOL/L (ref 7–15)
AST SERPL W P-5'-P-CCNC: 19 U/L (ref 0–45)
ATRIAL RATE - MUSE: 58 BPM
BASOPHILS # BLD AUTO: 0 10E3/UL (ref 0–0.2)
BASOPHILS NFR BLD AUTO: 1 %
BILIRUB SERPL-MCNC: 0.9 MG/DL
BUN SERPL-MCNC: 11.8 MG/DL (ref 6–20)
CALCIUM SERPL-MCNC: 9.1 MG/DL (ref 8.8–10.4)
CHLORIDE SERPL-SCNC: 101 MMOL/L (ref 98–107)
CREAT SERPL-MCNC: 0.78 MG/DL (ref 0.67–1.17)
DIASTOLIC BLOOD PRESSURE - MUSE: NORMAL MMHG
EGFRCR SERPLBLD CKD-EPI 2021: >90 ML/MIN/1.73M2
EOSINOPHIL # BLD AUTO: 0.2 10E3/UL (ref 0–0.7)
EOSINOPHIL NFR BLD AUTO: 3 %
ERYTHROCYTE [DISTWIDTH] IN BLOOD BY AUTOMATED COUNT: 12.4 % (ref 10–15)
GLUCOSE BLDC GLUCOMTR-MCNC: 279 MG/DL (ref 70–99)
GLUCOSE SERPL-MCNC: 276 MG/DL (ref 70–99)
HCO3 SERPL-SCNC: 23 MMOL/L (ref 22–29)
HCT VFR BLD AUTO: 41.1 % (ref 40–53)
HGB BLD-MCNC: 14.8 G/DL (ref 13.3–17.7)
HOLD SPECIMEN: NORMAL
IMM GRANULOCYTES # BLD: 0 10E3/UL
IMM GRANULOCYTES NFR BLD: 1 %
INTERPRETATION ECG - MUSE: NORMAL
LYMPHOCYTES # BLD AUTO: 1.7 10E3/UL (ref 0.8–5.3)
LYMPHOCYTES NFR BLD AUTO: 26 %
MAGNESIUM SERPL-MCNC: 1.9 MG/DL (ref 1.7–2.3)
MCH RBC QN AUTO: 30.9 PG (ref 26.5–33)
MCHC RBC AUTO-ENTMCNC: 36 G/DL (ref 31.5–36.5)
MCV RBC AUTO: 86 FL (ref 78–100)
MONOCYTES # BLD AUTO: 0.7 10E3/UL (ref 0–1.3)
MONOCYTES NFR BLD AUTO: 11 %
NEUTROPHILS # BLD AUTO: 3.8 10E3/UL (ref 1.6–8.3)
NEUTROPHILS NFR BLD AUTO: 59 %
NRBC # BLD AUTO: 0 10E3/UL
NRBC BLD AUTO-RTO: 0 /100
P AXIS - MUSE: 40 DEGREES
PLATELET # BLD AUTO: 159 10E3/UL (ref 150–450)
POTASSIUM SERPL-SCNC: 4.1 MMOL/L (ref 3.4–5.3)
PR INTERVAL - MUSE: 196 MS
PROT SERPL-MCNC: 7.3 G/DL (ref 6.4–8.3)
QRS DURATION - MUSE: 106 MS
QT - MUSE: 478 MS
QTC - MUSE: 469 MS
R AXIS - MUSE: 6 DEGREES
RBC # BLD AUTO: 4.79 10E6/UL (ref 4.4–5.9)
SODIUM SERPL-SCNC: 137 MMOL/L (ref 135–145)
SYSTOLIC BLOOD PRESSURE - MUSE: NORMAL MMHG
T AXIS - MUSE: 0 DEGREES
TROPONIN T SERPL HS-MCNC: <6 NG/L
VENTRICULAR RATE- MUSE: 58 BPM
WBC # BLD AUTO: 6.5 10E3/UL (ref 4–11)

## 2024-08-01 PROCEDURE — 83735 ASSAY OF MAGNESIUM: CPT | Performed by: STUDENT IN AN ORGANIZED HEALTH CARE EDUCATION/TRAINING PROGRAM

## 2024-08-01 PROCEDURE — 258N000003 HC RX IP 258 OP 636: Performed by: STUDENT IN AN ORGANIZED HEALTH CARE EDUCATION/TRAINING PROGRAM

## 2024-08-01 PROCEDURE — 93005 ELECTROCARDIOGRAM TRACING: CPT

## 2024-08-01 PROCEDURE — 250N000011 HC RX IP 250 OP 636: Performed by: STUDENT IN AN ORGANIZED HEALTH CARE EDUCATION/TRAINING PROGRAM

## 2024-08-01 PROCEDURE — 96361 HYDRATE IV INFUSION ADD-ON: CPT

## 2024-08-01 PROCEDURE — 85025 COMPLETE CBC W/AUTO DIFF WBC: CPT | Performed by: EMERGENCY MEDICINE

## 2024-08-01 PROCEDURE — 84450 TRANSFERASE (AST) (SGOT): CPT | Performed by: EMERGENCY MEDICINE

## 2024-08-01 PROCEDURE — 96374 THER/PROPH/DIAG INJ IV PUSH: CPT | Mod: 59

## 2024-08-01 PROCEDURE — 82962 GLUCOSE BLOOD TEST: CPT

## 2024-08-01 PROCEDURE — 36415 COLL VENOUS BLD VENIPUNCTURE: CPT | Performed by: EMERGENCY MEDICINE

## 2024-08-01 PROCEDURE — 99284 EMERGENCY DEPT VISIT MOD MDM: CPT | Mod: 25

## 2024-08-01 PROCEDURE — 84484 ASSAY OF TROPONIN QUANT: CPT | Performed by: STUDENT IN AN ORGANIZED HEALTH CARE EDUCATION/TRAINING PROGRAM

## 2024-08-01 RX ORDER — LISINOPRIL 5 MG/1
5 TABLET ORAL DAILY
Qty: 30 TABLET | Refills: 0 | Status: SHIPPED | OUTPATIENT
Start: 2024-08-01 | End: 2024-08-31

## 2024-08-01 RX ORDER — ONDANSETRON 2 MG/ML
4 INJECTION INTRAMUSCULAR; INTRAVENOUS ONCE
Status: COMPLETED | OUTPATIENT
Start: 2024-08-01 | End: 2024-08-01

## 2024-08-01 RX ORDER — ONDANSETRON 2 MG/ML
4 INJECTION INTRAMUSCULAR; INTRAVENOUS EVERY 30 MIN PRN
Status: DISCONTINUED | OUTPATIENT
Start: 2024-08-01 | End: 2024-08-01

## 2024-08-01 RX ADMIN — ONDANSETRON 4 MG: 2 INJECTION INTRAMUSCULAR; INTRAVENOUS at 09:27

## 2024-08-01 RX ADMIN — SODIUM CHLORIDE 1000 ML: 9 INJECTION, SOLUTION INTRAVENOUS at 09:27

## 2024-08-01 ASSESSMENT — ACTIVITIES OF DAILY LIVING (ADL)
ADLS_ACUITY_SCORE: 35

## 2024-08-01 NOTE — DISCHARGE INSTRUCTIONS
Thank you for allowing us to evaluate you today.  Follow up with primary care clinician  in 1-2 weeks for reevaluation..  Start taking the metformin and lisinopril for your type 2 diabetes mellitus and hypertension   Please read the guidance provided with your discharge instructions.  Immediately return to the emergency department with any concerns.

## 2024-08-01 NOTE — ED PROVIDER NOTES
Emergency Department Note      History of Present Illness     Chief Complaint   Dizziness and Nausea      HPI   Zach Cowan is a very pleasant 53 year old male with a history of type 2 diabetes mellitus and hypertension presenting with dizziness and nausea. The patient reports he felt very lightheaded and like he was going to pass out after getting out of bed this morning. He tried to continue on despite his symptoms, but they weren't getting any better, so he decided to come in to the ED. He felt fine before getting out of bed. He reports that he's felt very nauseous and vomited a few times before presentation. He was also sweating with chills. He reports that he has been eating and drinking normally, mentioning that he had 3-4 cans of [employer] beer last night which he usually has around 3x a week. He mentions that he has diabetes mellitus for which he was originally prescribed metformin, but he stopped taking this at his wife's recommendation (a nurse anesthetist), who said it was outdated. He switched to Mounjaro, but decided to not refill it 6 weeks ago due to the cost. He reports he takes Zoloft, Wellbutrin, and doxycycline for rosacea. He does not take any medications for blood pressure. He denies any pain anywhere. He also denies any diarrhea or frequent urination. He denies any shortness of breath. He reports he's had similar symptoms in the past and had to come to the ED via EMS in the past. He mentions he went to [place] about a week ago. He hasn't been sick at all recently.     Independent Historian   None    Review of External Notes   I personally reviewed notes from the patient's progress note dated  2/3/2024 .This provided me with information regarding patient's baseline medical problems.     I personally reviewed the patient's chart, including available medication list and available past medical history, past surgical history, family history, and social history.    Physical Exam     Patient  Vitals for the past 24 hrs:   BP Temp Temp src Pulse Resp SpO2   08/01/24 1113 (!) 150/88 -- -- 61 20 100 %   08/01/24 0909 (!) 164/86 97.9  F (36.6  C) Oral 57 -- --   08/01/24 0852 (!) 123/39 -- -- 58 24 100 %      Physical Exam  Vitals and nursing note reviewed.   Constitutional:       Appearance: He is obese. He is not ill-appearing or diaphoretic.   HENT:      Mouth/Throat:      Mouth: Mucous membranes are moist.   Eyes:      General: No scleral icterus.     Extraocular Movements: Extraocular movements intact.      Conjunctiva/sclera: Conjunctivae normal.   Cardiovascular:      Rate and Rhythm: Normal rate and regular rhythm.   Pulmonary:      Effort: Pulmonary effort is normal.      Breath sounds: Normal breath sounds.   Abdominal:      Palpations: Abdomen is soft.      Tenderness: There is no abdominal tenderness. There is no guarding or rebound.   Skin:     General: Skin is warm and dry.      Findings: No erythema or rash.   Neurological:      Mental Status: He is alert and oriented to person, place, and time.            Diagnostics     Lab Results   Labs Ordered and Resulted from Time of ED Arrival to Time of ED Departure   COMPREHENSIVE METABOLIC PANEL - Abnormal       Result Value    Sodium 137      Potassium 4.1      Carbon Dioxide (CO2) 23      Anion Gap 13      Urea Nitrogen 11.8      Creatinine 0.78      GFR Estimate >90      Calcium 9.1      Chloride 101      Glucose 276 (*)     Alkaline Phosphatase 46      AST 19      ALT 38      Protein Total 7.3      Albumin 4.8      Bilirubin Total 0.9     GLUCOSE BY METER - Abnormal    GLUCOSE BY METER POCT 279 (*)    TROPONIN T, HIGH SENSITIVITY - Normal    Troponin T, High Sensitivity <6     MAGNESIUM - Normal    Magnesium 1.9     CBC WITH PLATELETS AND DIFFERENTIAL    WBC Count 6.5      RBC Count 4.79      Hemoglobin 14.8      Hematocrit 41.1      MCV 86      MCH 30.9      MCHC 36.0      RDW 12.4      Platelet Count 159      % Neutrophils 59      %  Lymphocytes 26      % Monocytes 11      % Eosinophils 3      % Basophils 1      % Immature Granulocytes 1      NRBCs per 100 WBC 0      Absolute Neutrophils 3.8      Absolute Lymphocytes 1.7      Absolute Monocytes 0.7      Absolute Eosinophils 0.2      Absolute Basophils 0.0      Absolute Immature Granulocytes 0.0      Absolute NRBCs 0.0         Imaging   No orders to display       EKG  ECG taken at 0909, ECG read at 0909  Sinus bradycardia  Prolonged QT  Rate 58 bpm. HI interval 196 ms. QRS duration 106 ms. QT/QTc 478/469 ms. P-R-T axes 40 6 0.     Independent Interpretation   See ED Course below    ED Course      Medications Administered   Medications   sodium chloride 0.9% BOLUS 1,000 mL (0 mLs Intravenous Stopped 8/1/24 1110)   ondansetron (ZOFRAN) injection 4 mg (4 mg Intravenous $Given 8/1/24 0927)     Procedures   Procedures   None performed    Discussion of Management   See ED Course below    Social Determinants of Health adding to complexity of care   None.      ED Course   Independent Interpretation / Discussion of Management / Repeat Assessments  ED Course as of 08/01/24 1528   Thu Aug 01, 2024   0909 I obtained history and examined the patient as noted above.    1020 I rechecked and updated the patient.    1100 I rechecked and updated the patient.        Medical Decision Making / Diagnosis     CMS Diagnoses: None    MIPS       None    MDM   Patient presenting with lightheadedness and nausea.  Vital signs notable for elevated blood pressure but otherwise reassuring.  Considered differential including cardiac arrhythmia, acute coronary syndrome, electrolyte derangement, metabolic abnormality, diabetic emergency such as DKA/HHS, among others.  Workup here is essentially reassuring.  Patient does have hyperglycemia but no evidence of elevated anion gap suggestive of DKA.  Other labs are unremarkable.  EKG has no acute appearing ischemic changes and is unchanged from previous EKG with exception of a new T  wave inversion in lead III, which is normal variant.  Troponin is undetectable.  With IV fluids and Zofran, patient felt significantly improved and able to be discharged.  I feel this is appropriate.  I did recommend the patient restart metformin and prescribed 1 month for him and advised him to follow-up with his primary care clinician for reevaluation.  I also started him on lisinopril due to his elevated blood pressure.  This has been noted before, most recently in February by a pharmacist, and I think starting at this time is an appropriate intervention.  Return precautions provided.  Patient was discharged.     Disposition   The patient was discharged.     Diagnosis     ICD-10-CM    1. Lightheadedness  R42       2. Type 2 diabetes mellitus with hyperglycemia, without long-term current use of insulin (H)  E11.65       3. Nausea  R11.0       4. Hypertension, unspecified type  I10            Discharge Medications   Discharge Medication List as of 8/1/2024 11:10 AM        START taking these medications    Details   lisinopril (ZESTRIL) 5 MG tablet Take 1 tablet (5 mg) by mouth daily for 30 days, Disp-30 tablet, R-0, E-Prescribe      metFORMIN (GLUCOPHAGE) 500 MG tablet Take 1 tablet (500 mg) by mouth 2 times daily (with meals) for 30 days, Disp-60 tablet, R-0, E-Prescribe             Scribe Disclosure:  I, Leah Brice, am serving as a scribe at 9:07 AM on 8/1/2024 to document services personally performed by Alexis Ramires MD based on my observations and the provider's statements to me.       Alexis Ramires MD  08/01/24 0709

## 2024-08-01 NOTE — ED TRIAGE NOTES
Pt arrives through triage c/o lightheadedness and nausea which started 45 mins ago, hx DM2, pt last ate last night, pt does not take insulin, no SOB or CP, ABCD intact.       Triage Assessment (Adult)       Row Name 08/01/24 0859          Triage Assessment    Airway WDL WDL        Respiratory WDL    Respiratory WDL WDL        Skin Circulation/Temperature WDL    Skin Circulation/Temperature WDL WDL        Cardiac WDL    Cardiac WDL WDL        Peripheral/Neurovascular WDL    Peripheral Neurovascular WDL WDL        Cognitive/Neuro/Behavioral WDL    Cognitive/Neuro/Behavioral WDL WDL

## 2024-10-31 ENCOUNTER — HOSPITAL ENCOUNTER (EMERGENCY)
Facility: CLINIC | Age: 54
Discharge: HOME | End: 2024-10-31
Attending: EMERGENCY MEDICINE | Admitting: EMERGENCY MEDICINE
Payer: COMMERCIAL

## 2024-10-31 ENCOUNTER — APPOINTMENT (OUTPATIENT)
Dept: CT IMAGING | Facility: CLINIC | Age: 54
End: 2024-10-31
Attending: EMERGENCY MEDICINE
Payer: COMMERCIAL

## 2024-10-31 ENCOUNTER — APPOINTMENT (OUTPATIENT)
Dept: MRI IMAGING | Facility: CLINIC | Age: 54
End: 2024-10-31
Attending: EMERGENCY MEDICINE
Payer: COMMERCIAL

## 2024-10-31 VITALS
BODY MASS INDEX: 29.52 KG/M2 | SYSTOLIC BLOOD PRESSURE: 143 MMHG | RESPIRATION RATE: 18 BRPM | OXYGEN SATURATION: 96 % | HEIGHT: 74 IN | TEMPERATURE: 97.7 F | DIASTOLIC BLOOD PRESSURE: 79 MMHG | HEART RATE: 72 BPM | WEIGHT: 230 LBS

## 2024-10-31 DIAGNOSIS — R42 LIGHTHEADEDNESS: ICD-10-CM

## 2024-10-31 DIAGNOSIS — R42 DIZZINESS: ICD-10-CM

## 2024-10-31 DIAGNOSIS — R11.2 NAUSEA AND VOMITING, UNSPECIFIED VOMITING TYPE: ICD-10-CM

## 2024-10-31 LAB
ALBUMIN SERPL BCG-MCNC: 5.2 G/DL (ref 3.5–5.2)
ALP SERPL-CCNC: 53 U/L (ref 40–150)
ALT SERPL W P-5'-P-CCNC: 56 U/L (ref 0–70)
ANION GAP SERPL CALCULATED.3IONS-SCNC: 17 MMOL/L (ref 7–15)
AST SERPL W P-5'-P-CCNC: 29 U/L (ref 0–45)
ATRIAL RATE - MUSE: 67 BPM
BASOPHILS # BLD AUTO: 0.1 10E3/UL (ref 0–0.2)
BASOPHILS NFR BLD AUTO: 1 %
BILIRUB DIRECT SERPL-MCNC: 0.26 MG/DL (ref 0–0.3)
BILIRUB SERPL-MCNC: 1.5 MG/DL
BUN SERPL-MCNC: 18.4 MG/DL (ref 6–20)
CALCIUM SERPL-MCNC: 10.2 MG/DL (ref 8.8–10.4)
CHLORIDE SERPL-SCNC: 98 MMOL/L (ref 98–107)
CREAT SERPL-MCNC: 0.96 MG/DL (ref 0.67–1.17)
DIASTOLIC BLOOD PRESSURE - MUSE: NORMAL MMHG
EGFRCR SERPLBLD CKD-EPI 2021: >90 ML/MIN/1.73M2
EOSINOPHIL # BLD AUTO: 0.2 10E3/UL (ref 0–0.7)
EOSINOPHIL NFR BLD AUTO: 2 %
ERYTHROCYTE [DISTWIDTH] IN BLOOD BY AUTOMATED COUNT: 12 % (ref 10–15)
GLUCOSE BLDC GLUCOMTR-MCNC: 225 MG/DL (ref 70–99)
GLUCOSE SERPL-MCNC: 271 MG/DL (ref 70–99)
HCO3 SERPL-SCNC: 23 MMOL/L (ref 22–29)
HCT VFR BLD AUTO: 45.7 % (ref 40–53)
HGB BLD-MCNC: 16.1 G/DL (ref 13.3–17.7)
HOLD SPECIMEN: NORMAL
HOLD SPECIMEN: NORMAL
IMM GRANULOCYTES # BLD: 0.1 10E3/UL
IMM GRANULOCYTES NFR BLD: 1 %
INTERPRETATION ECG - MUSE: NORMAL
LYMPHOCYTES # BLD AUTO: 3.1 10E3/UL (ref 0.8–5.3)
LYMPHOCYTES NFR BLD AUTO: 29 %
MCH RBC QN AUTO: 29.8 PG (ref 26.5–33)
MCHC RBC AUTO-ENTMCNC: 35.2 G/DL (ref 31.5–36.5)
MCV RBC AUTO: 85 FL (ref 78–100)
MONOCYTES # BLD AUTO: 1 10E3/UL (ref 0–1.3)
MONOCYTES NFR BLD AUTO: 10 %
NEUTROPHILS # BLD AUTO: 6.1 10E3/UL (ref 1.6–8.3)
NEUTROPHILS NFR BLD AUTO: 58 %
NRBC # BLD AUTO: 0 10E3/UL
NRBC BLD AUTO-RTO: 0 /100
P AXIS - MUSE: 32 DEGREES
PLATELET # BLD AUTO: 258 10E3/UL (ref 150–450)
POTASSIUM SERPL-SCNC: 4.1 MMOL/L (ref 3.4–5.3)
PR INTERVAL - MUSE: 180 MS
PROT SERPL-MCNC: 8.2 G/DL (ref 6.4–8.3)
QRS DURATION - MUSE: 98 MS
QT - MUSE: 430 MS
QTC - MUSE: 454 MS
R AXIS - MUSE: 21 DEGREES
RBC # BLD AUTO: 5.4 10E6/UL (ref 4.4–5.9)
SODIUM SERPL-SCNC: 138 MMOL/L (ref 135–145)
SYSTOLIC BLOOD PRESSURE - MUSE: NORMAL MMHG
T AXIS - MUSE: 17 DEGREES
TROPONIN T SERPL HS-MCNC: <6 NG/L
VENTRICULAR RATE- MUSE: 67 BPM
WBC # BLD AUTO: 10.5 10E3/UL (ref 4–11)

## 2024-10-31 PROCEDURE — 96361 HYDRATE IV INFUSION ADD-ON: CPT

## 2024-10-31 PROCEDURE — 80048 BASIC METABOLIC PNL TOTAL CA: CPT | Performed by: EMERGENCY MEDICINE

## 2024-10-31 PROCEDURE — 85004 AUTOMATED DIFF WBC COUNT: CPT | Performed by: EMERGENCY MEDICINE

## 2024-10-31 PROCEDURE — 93005 ELECTROCARDIOGRAM TRACING: CPT

## 2024-10-31 PROCEDURE — 70498 CT ANGIOGRAPHY NECK: CPT

## 2024-10-31 PROCEDURE — 99285 EMERGENCY DEPT VISIT HI MDM: CPT | Mod: 25

## 2024-10-31 PROCEDURE — 250N000011 HC RX IP 250 OP 636: Performed by: EMERGENCY MEDICINE

## 2024-10-31 PROCEDURE — 96375 TX/PRO/DX INJ NEW DRUG ADDON: CPT

## 2024-10-31 PROCEDURE — 70450 CT HEAD/BRAIN W/O DYE: CPT

## 2024-10-31 PROCEDURE — 82962 GLUCOSE BLOOD TEST: CPT

## 2024-10-31 PROCEDURE — 36415 COLL VENOUS BLD VENIPUNCTURE: CPT | Performed by: EMERGENCY MEDICINE

## 2024-10-31 PROCEDURE — 258N000003 HC RX IP 258 OP 636: Performed by: EMERGENCY MEDICINE

## 2024-10-31 PROCEDURE — 250N000009 HC RX 250: Performed by: EMERGENCY MEDICINE

## 2024-10-31 PROCEDURE — 96374 THER/PROPH/DIAG INJ IV PUSH: CPT | Mod: 59

## 2024-10-31 PROCEDURE — 84484 ASSAY OF TROPONIN QUANT: CPT | Performed by: EMERGENCY MEDICINE

## 2024-10-31 PROCEDURE — 70496 CT ANGIOGRAPHY HEAD: CPT

## 2024-10-31 PROCEDURE — 70551 MRI BRAIN STEM W/O DYE: CPT

## 2024-10-31 PROCEDURE — 82248 BILIRUBIN DIRECT: CPT | Performed by: EMERGENCY MEDICINE

## 2024-10-31 RX ORDER — IOPAMIDOL 755 MG/ML
67 INJECTION, SOLUTION INTRAVASCULAR ONCE
Status: COMPLETED | OUTPATIENT
Start: 2024-10-31 | End: 2024-10-31

## 2024-10-31 RX ORDER — ONDANSETRON 4 MG/1
4 TABLET, ORALLY DISINTEGRATING ORAL EVERY 8 HOURS PRN
Qty: 10 TABLET | Refills: 0 | Status: SHIPPED | OUTPATIENT
Start: 2024-10-31 | End: 2024-11-03

## 2024-10-31 RX ORDER — LORAZEPAM 2 MG/ML
1.5 INJECTION INTRAMUSCULAR ONCE
Status: COMPLETED | OUTPATIENT
Start: 2024-10-31 | End: 2024-10-31

## 2024-10-31 RX ORDER — ONDANSETRON 2 MG/ML
4 INJECTION INTRAMUSCULAR; INTRAVENOUS ONCE
Status: COMPLETED | OUTPATIENT
Start: 2024-10-31 | End: 2024-10-31

## 2024-10-31 RX ADMIN — LORAZEPAM 1.5 MG: 2 INJECTION INTRAMUSCULAR; INTRAVENOUS at 09:01

## 2024-10-31 RX ADMIN — SODIUM CHLORIDE 100 ML: 9 INJECTION, SOLUTION INTRAVENOUS at 10:30

## 2024-10-31 RX ADMIN — IOPAMIDOL 67 ML: 755 INJECTION, SOLUTION INTRAVENOUS at 10:30

## 2024-10-31 RX ADMIN — ONDANSETRON 4 MG: 2 INJECTION, SOLUTION INTRAMUSCULAR; INTRAVENOUS at 08:20

## 2024-10-31 RX ADMIN — SODIUM CHLORIDE 1000 ML: 9 INJECTION, SOLUTION INTRAVENOUS at 08:23

## 2024-10-31 ASSESSMENT — COLUMBIA-SUICIDE SEVERITY RATING SCALE - C-SSRS
6. HAVE YOU EVER DONE ANYTHING, STARTED TO DO ANYTHING, OR PREPARED TO DO ANYTHING TO END YOUR LIFE?: NO
1. IN THE PAST MONTH, HAVE YOU WISHED YOU WERE DEAD OR WISHED YOU COULD GO TO SLEEP AND NOT WAKE UP?: NO
2. HAVE YOU ACTUALLY HAD ANY THOUGHTS OF KILLING YOURSELF IN THE PAST MONTH?: NO

## 2024-10-31 ASSESSMENT — ACTIVITIES OF DAILY LIVING (ADL)
ADLS_ACUITY_SCORE: 0

## 2024-10-31 NOTE — ED PROVIDER NOTES
Emergency Department Note      History of Present Illness     Chief Complaint  Dizziness    HPI  Zach Cowan is a 54 year old male with history of type 2 diabetes mellitus and hypertension who presents to the ED for dizziness. The patient reports that he woke up around 0730 this morning to lightheadedness with associated dry heaving. The patient states that he felt as if he would pass out. He believes that his symptoms are a consequence of high blood sugar levels although he didn't check. He compares today's dizziness to what he felt during his visit to the ED in August. He had hyperglycemia during that visit as well. The patient notes that he had a few alcohol drinks last night with 10 mg of THC in each. He states that he normally drinks twice a week. Denies chest pain, abdominal pain, headache, and anxiety.    Independent Historian  No    Review of External Notes  Yes I have reviewed the patient's last ER visit note when he was seen on 1 August of this year for lightheadedness, also has diabetes at that time with nausea.  I also have reviewed his last office visit on 13 February of 2024 where the patient was also seen for anxiety and depressive disorder and also his diabetes was managed at that time as well.      Past Medical History   Medical History and Problem List  Cocaine addiction  Anxiety   Type 2 diabetes mellitus  Hypertension    Medications  Metformin   Doxycycline   Mounjaro   Wellbutrin   Lisinopril  Zoloft   Trazodone     Surgical History   ACL reconstruction     Physical Exam   Patient Vitals for the past 24 hrs:   BP Temp Temp src Pulse Resp SpO2 Height Weight   10/31/24 1145 -- -- -- -- -- 96 % -- --   10/31/24 1045 (!) 143/79 -- -- -- -- -- -- --   10/31/24 0930 (!) 157/89 -- -- 72 18 99 % -- --   10/31/24 0900 (!) 168/97 -- -- 69 18 96 % -- --   10/31/24 0830 (!) 153/94 -- -- 67 18 100 % -- --   10/31/24 0822 -- 97.7  F (36.5  C) Oral -- -- -- -- --   10/31/24 0809 (!) 146/85 -- -- 85  "18 95 % 1.88 m (6' 2\") 104.3 kg (230 lb)       Physical Exam  Vitals: reviewed by me  General: [place] seen [place] [facility] [place], pleasant, cooperative, and alert to conversation  Eyes: Tracking well, clear conjunctiva BL  ENT: MMM, midline trachea.   Lungs: No tachypnea, no accessory muscle use. No respiratory distress.   CV: Rate as above  Abd: Soft, non tender, no guarding, no rebound. Non distended   MSK: no joint effusion.  No evidence of trauma  Skin: No rash  Neuro: Clear speech and no facial droop.  Normal finger-to-nose bilaterally, normal heel-to-shin bilaterally.  Psych: Not RIS, no e/o AH/VH      Diagnostics   Lab Results   Labs Ordered and Resulted from Time of ED Arrival to Time of ED Departure   GLUCOSE BY METER - Abnormal       Result Value    GLUCOSE BY METER POCT 225 (*)    BASIC METABOLIC PANEL - Abnormal    Sodium 138      Potassium 4.1      Chloride 98      Carbon Dioxide (CO2) 23      Anion Gap 17 (*)     Urea Nitrogen 18.4      Creatinine 0.96      GFR Estimate >90      Calcium 10.2      Glucose 271 (*)    HEPATIC FUNCTION PANEL - Abnormal    Protein Total 8.2      Albumin 5.2      Bilirubin Total 1.5 (*)     Alkaline Phosphatase 53      AST 29      ALT 56      Bilirubin Direct 0.26     TROPONIN T, HIGH SENSITIVITY - Normal    Troponin T, High Sensitivity <6     CBC WITH PLATELETS AND DIFFERENTIAL    WBC Count 10.5      RBC Count 5.40      Hemoglobin 16.1      Hematocrit 45.7      MCV 85      MCH 29.8      MCHC 35.2      RDW 12.0      Platelet Count 258      % Neutrophils 58      % Lymphocytes 29      % Monocytes 10      % Eosinophils 2      % Basophils 1      % Immature Granulocytes 1      NRBCs per 100 WBC 0      Absolute Neutrophils 6.1      Absolute Lymphocytes 3.1      Absolute Monocytes 1.0      Absolute Eosinophils 0.2      Absolute Basophils 0.1      Absolute Immature Granulocytes 0.1      Absolute NRBCs 0.0         Imaging  MR Brain w/o Contrast   Final Result   IMPRESSION: No evidence of " acute infarct on diffusion images. No   evidence of intracranial hemorrhage, mass effect, midline shift or   hydrocephalus. Otherwise essentially nondiagnostic MRI secondary to   motion.      KAMALJIT VILA DO            SYSTEM ID:  [id number]      CTA Head Neck with Contrast   Final Result   IMPRESSION: No large vessel arterial occlusion or high-grade stenosis   in the head or neck. No arterial dissection.      KAMALJIT VILA DO            SYSTEM ID:  [id number]      CT Head w/o Contrast   Final Result   IMPRESSION: Negative for acute intracranial hemorrhage, hydrocephalus   or transcortical infarct. No skull fracture.      KAMALJIT VILA DO            SYSTEM ID:  [id number]          EKG   ECG taken at 0823, ECG read at 1030  Normal sinus rhythm   Nonspecific T wave abnormality no longer evident in anterolateral leads as compared to prior, dated 8/1/24.  Rate 67 bpm. OR interval 180 ms. QRS duration 98 ms. QT/QTc 430/454 ms. P-R-T axes 32 21 17.       Independent Interpretation  Yes I have independently reviewed the patient's CT scan of the head, no obvious hemorrhage noted.      ED Course      Medications Administered   Medications   sodium chloride 0.9 % bag 500 mL for CT scan flush use (100 mLs As instructed $Given 10/31/24 1030)   midazolam (VERSED) injection 2.5 mg (0 mg Intravenous Hold 10/31/24 1045)   ondansetron (ZOFRAN) injection 4 mg (4 mg Intravenous $Given 10/31/24 0820)   sodium chloride 0.9% BOLUS 1,000 mL (0 mLs Intravenous Stopped 10/31/24 0923)   LORazepam (ATIVAN) injection 1.5 mg (1.5 mg Intravenous $Given 10/31/24 0901)   iopamidol (ISOVUE-370) solution 67 mL (67 mLs Intravenous $Given 10/31/24 1030)          Procedures      Discussion of Management   None        Optional/Additional Documentation  Stress/Adjustment Disorders       Medical Decision Making / Diagnosis           MDM  This is a very pleasant 54-year-old male who presents to the emergency room with dizziness and nausea.  It sounds like  dry heaving was his main issue but he does certainly endorse the idea of possible vertigo and unsteady gait.  Because of this we did do a full central vertigo workup as this had not been done before.  He is still able to ambulate well but does state that he feels like he may fall to 1 side or the other, but this is not noted visibly.  He is tolerating orals here, his workup is negative and he is now stable to go home.  His abdomen is benign, he does appear to be somewhat anxious but did very well after the Ativan which was given for antinausea.  I do think that he stable to follow-up with his regular doctor in a week ahead and have [facility].  Troponin is undetectably low, I do appreciate some hyperglycemia but no evidence of DKA.  Will plan for discharge as above, reliable appearing patient, red flags for any come back to the ER were discussed in detail    ICD-10 Codes:    ICD-10-CM    1. Lightheadedness  R42       2. Dizziness  R42       3. Nausea and vomiting, unspecified vomiting type  R11.2              Discharge Medications  Discharge Medication List as of 10/31/2024 12:06 PM        START taking these medications    Details   ondansetron (ZOFRAN ODT) 4 MG ODT tab Take 1 tablet (4 mg) by mouth every 8 hours as needed., Disp-10 tablet, R-0, Local Print           Scribe Disclosure:  Kahlil VILLANUEVA, am serving as a scribe at 9:29 AM on 10/31/2024 to document services personally performed by Kelvin Flores MD based on my observations and the provider's statements to me.               Kelvin Flores MD  10/31/24 [id number]

## 2024-10-31 NOTE — ED TRIAGE NOTES
Pt presents with N/V and dizziness starting this morning when he woke up. .      Triage Assessment (Adult)       Row Name 10/31/24 0809          Triage Assessment    Airway WDL WDL        Respiratory WDL    Respiratory WDL WDL        Cognitive/Neuro/Behavioral WDL    Cognitive/Neuro/Behavioral WDL --  Dizziness

## 2024-10-31 NOTE — DISCHARGE INSTRUCTIONS
As we discussed, your CT scans do not show any evidence of a stroke, and I suspect that your lightheadedness is not related to any emergent finding at this time.  Please come back to the ER immediately with any other concerns you have or any worsening symptoms, do use the medication that we have given you to help with your symptoms and follow-up with [facility] in the next 1 week as well as your regular doctor.  Come back with any other concerns.